# Patient Record
Sex: MALE | Race: WHITE | NOT HISPANIC OR LATINO
[De-identification: names, ages, dates, MRNs, and addresses within clinical notes are randomized per-mention and may not be internally consistent; named-entity substitution may affect disease eponyms.]

---

## 2019-02-14 ENCOUNTER — APPOINTMENT (OUTPATIENT)
Dept: CARDIOLOGY | Facility: CLINIC | Age: 39
End: 2019-02-14
Payer: COMMERCIAL

## 2019-02-14 ENCOUNTER — NON-APPOINTMENT (OUTPATIENT)
Age: 39
End: 2019-02-14

## 2019-02-14 VITALS
DIASTOLIC BLOOD PRESSURE: 85 MMHG | BODY MASS INDEX: 26.55 KG/M2 | WEIGHT: 196 LBS | HEIGHT: 72 IN | RESPIRATION RATE: 16 BRPM | SYSTOLIC BLOOD PRESSURE: 120 MMHG | HEART RATE: 72 BPM

## 2019-02-14 DIAGNOSIS — Z78.9 OTHER SPECIFIED HEALTH STATUS: ICD-10-CM

## 2019-02-14 DIAGNOSIS — Z72.3 LACK OF PHYSICAL EXERCISE: ICD-10-CM

## 2019-02-14 DIAGNOSIS — Z87.891 PERSONAL HISTORY OF NICOTINE DEPENDENCE: ICD-10-CM

## 2019-02-14 PROCEDURE — 93000 ELECTROCARDIOGRAM COMPLETE: CPT

## 2019-02-14 PROCEDURE — 99244 OFF/OP CNSLTJ NEW/EST MOD 40: CPT

## 2019-02-14 NOTE — HISTORY OF PRESENT ILLNESS
[FreeTextEntry1] : Patient comes in to seeing you in the office and been noted to have an abnormal EKG showing possible inferior Q waves. Per the patient these have shown up off and on over the years on his various EKGs. 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 He really feels a little palpitation but this does  not concern him. Patient denies chest pain, shortness of breath, orthopnea, presyncope, syncope.

## 2019-02-14 NOTE — DISCUSSION/SUMMARY
[FreeTextEntry1] : 1. Check echocardiogram given the abnormal EKG.\par 2. If echo is unremarkable no further cardiac treatment or testing at this time. We will discuss the results over the phone.\par 3. If echo is normal no need for further routine cardiac followup but I will be available as needed.

## 2019-02-14 NOTE — ASSESSMENT
[FreeTextEntry1] : EKG: Sinus rhythm with no significant ST or T wave changes.  EKG from your office does show borderline inferior Q waves.\par \par 38-year-old man with a significant past medical history other than dyslipidemia, primarily hypertriglyceridemia who presents today for evaluation of abnormal EKG showing borderline inferior Q waves. This likely represents a normal variant I do not believe that this patient had a prior MI. It would be reasonable to perform echocardiography to ensure this is the case but his echocardiogram is normal there is no need for further cardiac workup or treatment at this time. His triglycerides are elevated but apparently have improved and we discussed the need to continue work on improving and further

## 2019-03-07 ENCOUNTER — APPOINTMENT (OUTPATIENT)
Dept: CARDIOLOGY | Facility: CLINIC | Age: 39
End: 2019-03-07
Payer: COMMERCIAL

## 2019-03-07 PROCEDURE — 93306 TTE W/DOPPLER COMPLETE: CPT

## 2020-01-03 ENCOUNTER — TRANSCRIPTION ENCOUNTER (OUTPATIENT)
Age: 40
End: 2020-01-03

## 2020-10-14 ENCOUNTER — NON-APPOINTMENT (OUTPATIENT)
Age: 40
End: 2020-10-14

## 2020-10-14 ENCOUNTER — APPOINTMENT (OUTPATIENT)
Dept: CARDIOLOGY | Facility: CLINIC | Age: 40
End: 2020-10-14
Payer: COMMERCIAL

## 2020-10-14 VITALS
HEART RATE: 70 BPM | HEIGHT: 72 IN | TEMPERATURE: 96.6 F | OXYGEN SATURATION: 100 % | SYSTOLIC BLOOD PRESSURE: 145 MMHG | WEIGHT: 200 LBS | RESPIRATION RATE: 16 BRPM | DIASTOLIC BLOOD PRESSURE: 88 MMHG | BODY MASS INDEX: 27.09 KG/M2

## 2020-10-14 DIAGNOSIS — K21.9 GASTRO-ESOPHAGEAL REFLUX DISEASE W/OUT ESOPHAGITIS: ICD-10-CM

## 2020-10-14 PROCEDURE — 99214 OFFICE O/P EST MOD 30 MIN: CPT

## 2020-10-14 PROCEDURE — 93000 ELECTROCARDIOGRAM COMPLETE: CPT

## 2020-10-14 NOTE — DISCUSSION/SUMMARY
[FreeTextEntry1] : 1. Check echocardiogram given his possibly enlarged right side on his last echo.\par 2. Monitor BP at home, keep a log and bring to f/u.\par 3. No additional cardiac medications at this time.\par 4. Check blood work including a secondary workup for hypertension.\par 5. Patient strongly encouraged on reducing salt intake.\par 6. Follow up here in six weeks.

## 2020-10-14 NOTE — HISTORY OF PRESENT ILLNESS
[FreeTextEntry1] : Patient presents back today returning to the office because he was recently in urgent care and found to be hypertensive. He was working at his desk last week when he suddenly felt flushed and had a headache and a small nosebleed. He checked his blood pressure and it was in the 150s over 100s. He then went to urgent care and was evaluated with a blood pressure of 140s over 90s. He was given medication for hypertension but had not started. He also was told his EKG was abnormal and to follow up with cardiology. Since that day his symptoms all resolved. He has not been checking his blood pressure. He does report a couple of episodes of palpitations in the last week or so which he describes as after beating of his heart. This has occurred intermittently over the years now and then. No other symptoms. Patient denies chest pain, shortness of breath, orthopnea, presyncope, syncope.

## 2020-10-14 NOTE — ASSESSMENT
[FreeTextEntry1] : EKG: Sinus rhythm with no significant ST or T wave changes.\par \par 39-year-old man with no significant past medical history who presents today because of recently elevated blood pressures and a possibly abnormal EKG. Review of the EKG shows borderline inferior Q waves as had been seen previously and are of no clinical significance. His blood pressure is certainly elevated here today. At this time I would like him to start monitoring it at home before making decisions regarding treatment. Will also initiate a secondary workup for hypertension. Echocardiogram last year showed a questionably enlarged right side and I will repeat his echo at this time as well.

## 2020-11-20 ENCOUNTER — APPOINTMENT (OUTPATIENT)
Dept: CARDIOLOGY | Facility: CLINIC | Age: 40
End: 2020-11-20
Payer: COMMERCIAL

## 2020-11-20 PROCEDURE — 93306 TTE W/DOPPLER COMPLETE: CPT

## 2020-12-01 ENCOUNTER — APPOINTMENT (OUTPATIENT)
Dept: CARDIOLOGY | Facility: CLINIC | Age: 40
End: 2020-12-01
Payer: COMMERCIAL

## 2020-12-01 VITALS
DIASTOLIC BLOOD PRESSURE: 93 MMHG | TEMPERATURE: 98.1 F | HEIGHT: 72 IN | RESPIRATION RATE: 16 BRPM | BODY MASS INDEX: 26.68 KG/M2 | WEIGHT: 197 LBS | OXYGEN SATURATION: 99 % | HEART RATE: 81 BPM | SYSTOLIC BLOOD PRESSURE: 155 MMHG

## 2020-12-01 DIAGNOSIS — Z82.49 FAMILY HISTORY OF ISCHEMIC HEART DISEASE AND OTHER DISEASES OF THE CIRCULATORY SYSTEM: ICD-10-CM

## 2020-12-01 DIAGNOSIS — R03.0 ELEVATED BLOOD-PRESSURE READING, W/OUT DIAGNOSIS OF HYPERTENSION: ICD-10-CM

## 2020-12-01 DIAGNOSIS — R94.31 ABNORMAL ELECTROCARDIOGRAM [ECG] [EKG]: ICD-10-CM

## 2020-12-01 PROCEDURE — 99072 ADDL SUPL MATRL&STAF TM PHE: CPT

## 2020-12-01 PROCEDURE — 99214 OFFICE O/P EST MOD 30 MIN: CPT

## 2020-12-01 NOTE — DISCUSSION/SUMMARY
[FreeTextEntry1] : 1. No additional cardiac testing at this time.\par 2. Start lisinopril HCT 20/12.5 mg daily for hypertension.  Repeat blood work in 2 to 4 weeks.\par 3. Monitor BP at home, keep a log and bring to f/u.\par 4. Patient strongly encouraged on reducing salt intake.\par 5. Patient encouraged to work on a healthy diet high in lean protein, whole grains and vegetables, and lower in white flour and simple sugars.\par 6. Patient is encouraged to exercise at least 30 minutes a day everyday of the week.\par 7. Follow up here in two months.\par

## 2020-12-01 NOTE — ASSESSMENT
[FreeTextEntry1] : [place] November 20, 2020 demonstrated left ventricle normal in size and function with ejection fraction of 55 to 60%.  No significant valvular or structural abnormality noted.  Right side appears normal in size.\par \par 39-year-old man with no significant past medical history who presents for follow-up.  Review of his blood pressure shows continued and sustained elevations.  The patient clearly has hypertension and will require therapy with at least 2, if not 3, medications.  Review of his blood work shows no evidence of a secondary cause of hypertension.  Lipids are somewhat elevated but for now do not require treatment.  We discussed the importance of diet and exercise.  There is no evidence of diabetes.  Patient is otherwise stable.

## 2020-12-01 NOTE — HISTORY OF PRESENT ILLNESS
[FreeTextEntry1] : Patient presents back today feeling well and offering no complaints.  He continues to be active and is able to do so with no physical limitations at all.  He has been monitoring his blood pressure at home and it has been running from the 130s to 170s over 80s to 90s.  He remains on no medication at this time.  Patient denies chest pain, shortness of breath, palpitations, orthopnea, presyncope, syncope.

## 2021-03-04 ENCOUNTER — APPOINTMENT (OUTPATIENT)
Dept: CARDIOLOGY | Facility: CLINIC | Age: 41
End: 2021-03-04
Payer: COMMERCIAL

## 2021-03-04 ENCOUNTER — NON-APPOINTMENT (OUTPATIENT)
Age: 41
End: 2021-03-04

## 2021-03-04 VITALS
BODY MASS INDEX: 27.09 KG/M2 | DIASTOLIC BLOOD PRESSURE: 76 MMHG | RESPIRATION RATE: 12 BRPM | WEIGHT: 200 LBS | TEMPERATURE: 97.9 F | HEART RATE: 79 BPM | HEIGHT: 72 IN | SYSTOLIC BLOOD PRESSURE: 110 MMHG

## 2021-03-04 PROCEDURE — 99072 ADDL SUPL MATRL&STAF TM PHE: CPT

## 2021-03-04 PROCEDURE — 99213 OFFICE O/P EST LOW 20 MIN: CPT

## 2021-03-04 PROCEDURE — 93000 ELECTROCARDIOGRAM COMPLETE: CPT

## 2021-03-04 NOTE — HISTORY OF PRESENT ILLNESS
[FreeTextEntry1] : Patient presents back today having had COVID-19 in early January.  He had a relatively mild case with some fever and congestion and recovered quickly.  He has no residual symptoms at all.  He otherwise has been feeling well although it has been a bit stressful in his family.  Blood pressures have been in the 110s to 120s over 70s and he tolerated the addition of lisinopril HCT without a problem.  He does have some occasional dizziness on standing but this does not seem to bother him very much.  Patient denies chest pain, shortness of breath, palpitations, orthopnea, presyncope, syncope.

## 2021-03-04 NOTE — DISCUSSION/SUMMARY
[FreeTextEntry1] : 1. No additional cardiac testing at this time.\par 2. Continue HCT 20/12.5 mg daily for hypertension.  Repeat blood work.\par 3. Monitor BP at home, keep a log and bring to f/u.\par 4. Patient strongly encouraged on reducing salt intake.\par 5. Patient encouraged to work on a healthy diet high in lean protein, whole grains and vegetables, and lower in white flour and simple sugars.\par 6. Patient is encouraged to exercise at least 30 minutes a day everyday of the week.\par 7. Follow up here in 4 to 5 months

## 2021-03-04 NOTE — ASSESSMENT
[FreeTextEntry1] : Echocardiogram November 20, 2020 demonstrated left ventricle normal in size and function with ejection fraction of 55 to 60%.  No significant valvular or structural abnormality noted.  Right side appears normal in size.\par \par EKG: Sinus rhythm with no significant ST or T wave changes.\par \par 40-year-old man with past medical history of recently diagnosed hypertension and dyslipidemia who presents to me for follow-up.  Patient tolerated the addition of lisinopril HCT with no significant issue.  Blood pressures are now very well controlled.  He has no other symptoms at this time and is feeling well, having recovered from his COVID-19 infection.  I have encouraged him to get into exercising more which certainly should help with his mild dyslipidemia.

## 2021-03-10 LAB
ALBUMIN SERPL ELPH-MCNC: 4.7 G/DL
ALP BLD-CCNC: 87 U/L
ALT SERPL-CCNC: 27 U/L
ANION GAP SERPL CALC-SCNC: 11 MMOL/L
AST SERPL-CCNC: 19 U/L
BILIRUB SERPL-MCNC: 0.4 MG/DL
BUN SERPL-MCNC: 11 MG/DL
CALCIUM SERPL-MCNC: 9.7 MG/DL
CHLORIDE SERPL-SCNC: 99 MMOL/L
CHOLEST SERPL-MCNC: 229 MG/DL
CO2 SERPL-SCNC: 26 MMOL/L
CREAT SERPL-MCNC: 0.91 MG/DL
GLUCOSE SERPL-MCNC: 89 MG/DL
HDLC SERPL-MCNC: 39 MG/DL
LDLC SERPL CALC-MCNC: NORMAL MG/DL
MAGNESIUM SERPL-MCNC: 2.1 MG/DL
NONHDLC SERPL-MCNC: 190 MG/DL
POTASSIUM SERPL-SCNC: 4.6 MMOL/L
PROT SERPL-MCNC: 7.7 G/DL
SODIUM SERPL-SCNC: 137 MMOL/L
TRIGL SERPL-MCNC: 458 MG/DL

## 2021-03-11 ENCOUNTER — NON-APPOINTMENT (OUTPATIENT)
Age: 41
End: 2021-03-11

## 2021-06-11 ENCOUNTER — RX RENEWAL (OUTPATIENT)
Age: 41
End: 2021-06-11

## 2021-08-07 ENCOUNTER — LABORATORY RESULT (OUTPATIENT)
Age: 41
End: 2021-08-07

## 2021-08-09 ENCOUNTER — APPOINTMENT (OUTPATIENT)
Dept: CARDIOLOGY | Facility: CLINIC | Age: 41
End: 2021-08-09
Payer: COMMERCIAL

## 2021-08-09 ENCOUNTER — NON-APPOINTMENT (OUTPATIENT)
Age: 41
End: 2021-08-09

## 2021-08-09 VITALS
DIASTOLIC BLOOD PRESSURE: 97 MMHG | HEIGHT: 72 IN | SYSTOLIC BLOOD PRESSURE: 158 MMHG | BODY MASS INDEX: 26.55 KG/M2 | RESPIRATION RATE: 16 BRPM | OXYGEN SATURATION: 100 % | HEART RATE: 77 BPM | WEIGHT: 196 LBS

## 2021-08-09 DIAGNOSIS — I51.7 CARDIOMEGALY: ICD-10-CM

## 2021-08-09 PROCEDURE — 99213 OFFICE O/P EST LOW 20 MIN: CPT

## 2021-08-09 PROCEDURE — 93000 ELECTROCARDIOGRAM COMPLETE: CPT

## 2021-08-09 RX ORDER — LISINOPRIL AND HYDROCHLOROTHIAZIDE TABLETS 20; 12.5 MG/1; MG/1
20-12.5 TABLET ORAL DAILY
Qty: 90 | Refills: 1 | Status: DISCONTINUED | COMMUNITY
Start: 2020-12-01 | End: 2021-08-09

## 2021-08-09 NOTE — ASSESSMENT
[FreeTextEntry1] : Echocardiogram November 20, 2020 demonstrated left ventricle normal in size and function with ejection fraction of 55 to 60%.  No significant valvular or structural abnormality noted.  Right side appears normal in size.\par \par EKG: Sinus rhythm with no significant ST or T wave changes.\par \par 40-year-old man with past medical history of recently diagnosed hypertension and dyslipidemia who presents to me for follow-up.  Patient recently stopped his lisinopril HCT on his own because he thought it was related to his erectile dysfunction.  That has improved and his blood pressures appear to still be well controlled at home.  His blood pressure is more elevated here today but he admits to having a lot of angst about coming here given the issues with stopping his medication.  For now we will not add any medication but he will continue monitoring it closely at home.  I will repeat his blood work in get a direct LDL and reevaluation of Roberta steroids.  Consider low vase if his triglycerides remain elevated.

## 2021-08-09 NOTE — DISCUSSION/SUMMARY
[FreeTextEntry1] : 1. No additional cardiac testing at this time.  Repeat blood work to evaluate his lipids, including direct LDL prior to follow-up.\par 2. Continue off medication for hypertension for now.\par 3. Monitor BP at home, keep a log and bring to f/u.\par 4. Patient strongly encouraged on reducing salt intake.\par 5. Patient encouraged to work on a healthy diet high in lean protein, whole grains and vegetables, and lower in white flour and simple sugars.\par 6. Patient is encouraged to exercise at least 30 minutes a day everyday of the week.\par 7. Follow up here in 2 months.

## 2021-08-09 NOTE — HISTORY OF PRESENT ILLNESS
[FreeTextEntry1] : Patient presents back today having stopped his lisinopril HCT about 3 weeks ago.  He was having some issues with erectile dysfunction and noticed after he missed it for a few days of the seem to be better.  He then stopped the medication altogether.  He reports that his blood pressures have been consistently in the 110s to 120s over 60s since he stopped it right up until coming here today.  He reports no other physical symptoms at all at this time and is otherwise doing well.  He has made some adjustments to his diet lost a few pounds as well as been doing more exercising.  Patient denies chest pain, shortness of breath, palpitations, orthopnea, presyncope, syncope.

## 2021-10-19 ENCOUNTER — NON-APPOINTMENT (OUTPATIENT)
Age: 41
End: 2021-10-19

## 2021-10-19 ENCOUNTER — APPOINTMENT (OUTPATIENT)
Dept: CARDIOLOGY | Facility: CLINIC | Age: 41
End: 2021-10-19
Payer: COMMERCIAL

## 2021-10-19 VITALS
WEIGHT: 193 LBS | OXYGEN SATURATION: 98 % | BODY MASS INDEX: 26.14 KG/M2 | DIASTOLIC BLOOD PRESSURE: 87 MMHG | HEART RATE: 83 BPM | RESPIRATION RATE: 16 BRPM | HEIGHT: 72 IN | SYSTOLIC BLOOD PRESSURE: 149 MMHG

## 2021-10-19 LAB
CHOLEST SERPL-MCNC: 177 MG/DL
HDLC SERPL-MCNC: 41 MG/DL
LDLC SERPL CALC-MCNC: 108 MG/DL
LDLC SERPL DIRECT ASSAY-MCNC: 113 MG/DL
NONHDLC SERPL-MCNC: 136 MG/DL
TRIGL SERPL-MCNC: 139 MG/DL

## 2021-10-19 PROCEDURE — 99213 OFFICE O/P EST LOW 20 MIN: CPT

## 2021-10-19 PROCEDURE — 93000 ELECTROCARDIOGRAM COMPLETE: CPT

## 2021-10-19 RX ORDER — OMEPRAZOLE AND SODIUM BICARBONATE 20; 1100 MG/1; MG/1
CAPSULE ORAL
Refills: 0 | Status: DISCONTINUED | COMMUNITY
End: 2021-10-19

## 2021-10-19 NOTE — HISTORY OF PRESENT ILLNESS
[FreeTextEntry1] : Patient presents back today feeling very well and offering no complaints.  Blood pressures have been ranging from 120s to 150s over 70s to 90s but mostly in the 130s and 140s systolic.  Diastolics mostly in the 80s and 90s.  He continues to be active but does not do any regular exercise.  No symptoms when he exerts himself.  Patient denies chest pain, shortness of breath, palpitations, orthopnea, presyncope, syncope.

## 2021-10-19 NOTE — DISCUSSION/SUMMARY
[FreeTextEntry1] : 1. No additional cardiac testing at this time.  \par 2. Start nifedipine ER 30 mg daily for hypertension.\par 3. Monitor BP at home, keep a log and bring to f/u.\par 4. Patient strongly encouraged on reducing salt intake.\par 5. Patient encouraged to work on a healthy diet high in lean protein, whole grains and vegetables, and lower in white flour and simple sugars.\par 6. Patient is encouraged to exercise at least 30 minutes a day everyday of the week.\par 7. Follow up here in 3-4 months.

## 2021-10-19 NOTE — ASSESSMENT
[FreeTextEntry1] : Echocardiogram November 20, 2020 demonstrated left ventricle normal in size and function with ejection fraction of 55 to 60%.  No significant valvular or structural abnormality noted.  Right side appears normal in size.\par \par EKG: Sinus rhythm with no significant ST or T wave changes.\par \par 40-year-old man with past medical history of recently diagnosed hypertension and dyslipidemia who presents to me for follow-up.  Patient is to be stable from a cardiac standpoint but blood pressures remain elevated.  At this time I will try him on antihypertensive therapy again.  I will try something new as he had issues tolerating lisinopril HCT.  Review of his lipids showed significant improvement with triglycerides that are now normal, his LDL is lower and his HDL has improved.  I have encouraged him to continue all of his dietary efforts.  His weight is very good.

## 2021-12-20 ENCOUNTER — RX RENEWAL (OUTPATIENT)
Age: 41
End: 2021-12-20

## 2022-01-19 ENCOUNTER — NON-APPOINTMENT (OUTPATIENT)
Age: 42
End: 2022-01-19

## 2022-01-27 ENCOUNTER — NON-APPOINTMENT (OUTPATIENT)
Age: 42
End: 2022-01-27

## 2022-01-27 ENCOUNTER — APPOINTMENT (OUTPATIENT)
Dept: INTERNAL MEDICINE | Facility: CLINIC | Age: 42
End: 2022-01-27
Payer: COMMERCIAL

## 2022-01-27 VITALS
DIASTOLIC BLOOD PRESSURE: 90 MMHG | HEART RATE: 61 BPM | HEIGHT: 72 IN | BODY MASS INDEX: 25.47 KG/M2 | WEIGHT: 188 LBS | TEMPERATURE: 97.3 F | OXYGEN SATURATION: 98 % | RESPIRATION RATE: 14 BRPM | SYSTOLIC BLOOD PRESSURE: 140 MMHG

## 2022-01-27 DIAGNOSIS — Z23 ENCOUNTER FOR IMMUNIZATION: ICD-10-CM

## 2022-01-27 DIAGNOSIS — Z00.00 ENCOUNTER FOR GENERAL ADULT MEDICAL EXAMINATION W/OUT ABNORMAL FINDINGS: ICD-10-CM

## 2022-01-27 DIAGNOSIS — Z83.79 FAMILY HISTORY OF OTHER DISEASES OF THE DIGESTIVE SYSTEM: ICD-10-CM

## 2022-01-27 PROCEDURE — 90715 TDAP VACCINE 7 YRS/> IM: CPT

## 2022-01-27 PROCEDURE — 90472 IMMUNIZATION ADMIN EACH ADD: CPT

## 2022-01-27 PROCEDURE — 99386 PREV VISIT NEW AGE 40-64: CPT | Mod: 25

## 2022-01-27 PROCEDURE — 90686 IIV4 VACC NO PRSV 0.5 ML IM: CPT

## 2022-01-27 PROCEDURE — G0008: CPT

## 2022-01-27 NOTE — HISTORY OF PRESENT ILLNESS
[de-identified] : 41 y.o. M with PMHx of GERD, hypertriglyceridemia, and HTN presents as new pt to establish care. Pt has been working on his diet and has lost weight. Pt is a former smoker 20+ years, 2 packs a day, quit in 2017. Feeling well, no acute complaints. Needs forms for work filled out.

## 2022-01-27 NOTE — PLAN
[FreeTextEntry1] : HCM: \par -check labs \par -covid vaccine pfizer x2 \par -flu shot today \par -tdap today \par \par HTN: elevated today, pt had caffeine, pt checks at home and reports it is usually lower, continue with nifedipine, monitor BP at home and call if elevated \par

## 2022-01-28 LAB
25(OH)D3 SERPL-MCNC: 15 NG/ML
ALBUMIN SERPL ELPH-MCNC: 4.9 G/DL
ALP BLD-CCNC: 100 U/L
ALT SERPL-CCNC: 20 U/L
ANION GAP SERPL CALC-SCNC: 20 MMOL/L
AST SERPL-CCNC: 17 U/L
BASOPHILS # BLD AUTO: 0.03 K/UL
BASOPHILS NFR BLD AUTO: 0.5 %
BILIRUB SERPL-MCNC: 0.2 MG/DL
BUN SERPL-MCNC: 12 MG/DL
CALCIUM SERPL-MCNC: 10.1 MG/DL
CHLORIDE SERPL-SCNC: 99 MMOL/L
CHOLEST SERPL-MCNC: 212 MG/DL
CO2 SERPL-SCNC: 21 MMOL/L
CREAT SERPL-MCNC: 0.86 MG/DL
EOSINOPHIL # BLD AUTO: 0.07 K/UL
EOSINOPHIL NFR BLD AUTO: 1.1 %
ESTIMATED AVERAGE GLUCOSE: 108 MG/DL
GLUCOSE SERPL-MCNC: 103 MG/DL
HBA1C MFR BLD HPLC: 5.4 %
HBV SURFACE AB SER QL: REACTIVE
HCT VFR BLD CALC: 44.5 %
HDLC SERPL-MCNC: 44 MG/DL
HGB BLD-MCNC: 15.2 G/DL
IMM GRANULOCYTES NFR BLD AUTO: 0.5 %
LDLC SERPL CALC-MCNC: 134 MG/DL
LYMPHOCYTES # BLD AUTO: 1.53 K/UL
LYMPHOCYTES NFR BLD AUTO: 24.8 %
MAN DIFF?: NORMAL
MCHC RBC-ENTMCNC: 31 PG
MCHC RBC-ENTMCNC: 34.2 GM/DL
MCV RBC AUTO: 90.6 FL
MEV IGG FLD QL IA: 178 AU/ML
MEV IGG+IGM SER-IMP: POSITIVE
MONOCYTES # BLD AUTO: 0.62 K/UL
MONOCYTES NFR BLD AUTO: 10 %
MUV AB SER-ACNC: POSITIVE
MUV IGG SER QL IA: 198 AU/ML
NEUTROPHILS # BLD AUTO: 3.89 K/UL
NEUTROPHILS NFR BLD AUTO: 63.1 %
NONHDLC SERPL-MCNC: 167 MG/DL
PLATELET # BLD AUTO: 165 K/UL
POTASSIUM SERPL-SCNC: 4.7 MMOL/L
PROT SERPL-MCNC: 7.9 G/DL
RBC # BLD: 4.91 M/UL
RBC # FLD: 12.8 %
RUBV IGG FLD-ACNC: 3.4 INDEX
RUBV IGG SER-IMP: POSITIVE
SODIUM SERPL-SCNC: 141 MMOL/L
TRIGL SERPL-MCNC: 166 MG/DL
TSH SERPL-ACNC: 1.04 UIU/ML
VZV AB TITR SER: POSITIVE
VZV IGG SER IF-ACNC: 1530 INDEX
WBC # FLD AUTO: 6.17 K/UL

## 2022-02-01 LAB
M TB IFN-G BLD-IMP: NEGATIVE
QUANTIFERON TB PLUS MITOGEN MINUS NIL: 10 IU/ML
QUANTIFERON TB PLUS NIL: 0 IU/ML
QUANTIFERON TB PLUS TB1 MINUS NIL: 0 IU/ML
QUANTIFERON TB PLUS TB2 MINUS NIL: 0 IU/ML

## 2022-02-03 ENCOUNTER — NON-APPOINTMENT (OUTPATIENT)
Age: 42
End: 2022-02-03

## 2022-02-03 ENCOUNTER — APPOINTMENT (OUTPATIENT)
Dept: CARDIOLOGY | Facility: CLINIC | Age: 42
End: 2022-02-03
Payer: COMMERCIAL

## 2022-02-03 VITALS
RESPIRATION RATE: 14 BRPM | BODY MASS INDEX: 25.21 KG/M2 | OXYGEN SATURATION: 98 % | WEIGHT: 186.13 LBS | HEIGHT: 72 IN | DIASTOLIC BLOOD PRESSURE: 82 MMHG | HEART RATE: 74 BPM | SYSTOLIC BLOOD PRESSURE: 114 MMHG

## 2022-02-03 PROCEDURE — 93000 ELECTROCARDIOGRAM COMPLETE: CPT

## 2022-02-03 PROCEDURE — 99213 OFFICE O/P EST LOW 20 MIN: CPT

## 2022-02-03 NOTE — ASSESSMENT
[FreeTextEntry1] : Echocardiogram November 20, 2020 demonstrated left ventricle normal in size and function with ejection fraction of 55 to 60%.  No significant valvular or structural abnormality noted.  Right side appears normal in size.\par \par EKG: Sinus rhythm with no significant ST or T wave changes.\par \par 40-year-old man with past medical history of hypertension and dyslipidemia who presents to me for follow-up.  Patient is to be stable from a cardiac standpoint.  He tolerated the addition of nifedipine without a problem.  His blood pressures appear to be somewhat better although not still perfectly controlled.  Evaluation of his machine today shows that it may be reading a little bit high.  I have asked him to continue taking the medication and change the batteries in his machine.  He will continue checking it at home.  I have also encouraged him to get in doing some more regular exercise.  He may ultimately need higher dosing of his medication but I will not make any changes for now.  Lipid control appears adequate although his LDL did go up a little bit since his prior check.

## 2022-02-03 NOTE — HISTORY OF PRESENT ILLNESS
[FreeTextEntry1] : Patient has back to the office today feeling very well and offering no complaints.  He continues to be very active at work and in his daily life without any physical limitations.  He admits that he has not do any higher level exercise but is considering getting a universal gym at home.  Blood pressures have been better to some degree ranging in the 120s to 140s over 70s to 80s.  Patient denies chest pain, shortness of breath, palpitations, orthopnea, presyncope, syncope.

## 2022-02-03 NOTE — DISCUSSION/SUMMARY
[FreeTextEntry1] : 1. No additional cardiac testing at this time.  \par 2. Continue nifedipine ER 30 mg daily for hypertension.\par 3. Monitor BP at home, keep a log and bring to f/u.\par 4. Patient strongly encouraged on reducing salt intake.\par 5. Patient encouraged to work on a healthy diet high in lean protein, whole grains and vegetables, and lower in white flour and simple sugars.\par 6. Patient is encouraged to exercise at least 30 minutes a day everyday of the week.\par 7. Follow up here in 3-4 months.

## 2022-05-03 ENCOUNTER — RX RENEWAL (OUTPATIENT)
Age: 42
End: 2022-05-03

## 2022-06-02 ENCOUNTER — APPOINTMENT (OUTPATIENT)
Dept: CARDIOLOGY | Facility: CLINIC | Age: 42
End: 2022-06-02
Payer: COMMERCIAL

## 2022-06-02 ENCOUNTER — NON-APPOINTMENT (OUTPATIENT)
Age: 42
End: 2022-06-02

## 2022-06-02 VITALS
SYSTOLIC BLOOD PRESSURE: 152 MMHG | DIASTOLIC BLOOD PRESSURE: 99 MMHG | OXYGEN SATURATION: 100 % | HEART RATE: 82 BPM | HEIGHT: 72 IN | RESPIRATION RATE: 16 BRPM | WEIGHT: 189 LBS | BODY MASS INDEX: 25.6 KG/M2

## 2022-06-02 VITALS — SYSTOLIC BLOOD PRESSURE: 130 MMHG | DIASTOLIC BLOOD PRESSURE: 80 MMHG

## 2022-06-02 DIAGNOSIS — E55.9 VITAMIN D DEFICIENCY, UNSPECIFIED: ICD-10-CM

## 2022-06-02 PROCEDURE — 93000 ELECTROCARDIOGRAM COMPLETE: CPT

## 2022-06-02 PROCEDURE — 99213 OFFICE O/P EST LOW 20 MIN: CPT

## 2022-06-02 NOTE — HISTORY OF PRESENT ILLNESS
[FreeTextEntry1] : Patient has back to the office today feeling well and offering no complaints.  He continues to tolerate his medication without a problem.  He reports blood pressures in the 130s over 80s at home.  He did change the batteries in his machine but never brought it in to be calibrated.  He did not bring a list with him here today.  He continues to be active and plays softball for exercise and is able to all of that with no limitations.  Patient denies chest pain, shortness of breath, palpitations, orthopnea, presyncope, syncope.

## 2022-06-02 NOTE — ASSESSMENT
[FreeTextEntry1] : Echocardiogram November 20, 2020 demonstrated left ventricle normal in size and function with ejection fraction of 55 to 60%.  No significant valvular or structural abnormality noted.  Right side appears normal in size.\par \par EKG: Sinus rhythm with no significant ST or T wave changes.\par \par 41-year-old man with past medical history of hypertension and dyslipidemia who presents to me for follow-up.  Patient is to be stable from a cardiac standpoint.  Blood pressure still somewhat borderline but I will continue to hold off on making any additional changes for now.  I have encouraged him to work on reducing salt in his diet.  Recent blood work did show an increase in his LDL and his triglycerides a little bit but history glycerides are still much lower than they were a year ago.  We talked also about the need to cut down on carbohydrates despite his very good control and his weight.  Also continued efforts to exercise would be helpful.

## 2022-06-02 NOTE — DISCUSSION/SUMMARY
[FreeTextEntry1] : 1. No additional cardiac testing at this time.  \par 2. Continue nifedipine ER 30 mg daily for hypertension.\par 3. Monitor BP at home, keep a log and bring to f/u.  He will bring his machine in to have it calibrated again at follow-up.\par 4. Patient strongly encouraged on reducing salt intake.\par 5. Patient encouraged to work on a healthy diet high in lean protein, whole grains and vegetables, and lower in white flour and simple sugars.\par 6. Patient is encouraged to exercise at least 30 minutes a day everyday of the week.\par 7. Follow up here in 6 months.

## 2022-06-10 ENCOUNTER — NON-APPOINTMENT (OUTPATIENT)
Age: 42
End: 2022-06-10

## 2022-06-10 LAB
25(OH)D3 SERPL-MCNC: 73 NG/ML
ALBUMIN SERPL ELPH-MCNC: 4.9 G/DL
ALP BLD-CCNC: 108 U/L
ALT SERPL-CCNC: 17 U/L
ANION GAP SERPL CALC-SCNC: 17 MMOL/L
AST SERPL-CCNC: 16 U/L
BILIRUB SERPL-MCNC: 0.4 MG/DL
BUN SERPL-MCNC: 15 MG/DL
CALCIUM SERPL-MCNC: 10.7 MG/DL
CHLORIDE SERPL-SCNC: 99 MMOL/L
CHOLEST SERPL-MCNC: 213 MG/DL
CO2 SERPL-SCNC: 24 MMOL/L
CREAT SERPL-MCNC: 0.83 MG/DL
EGFR: 111 ML/MIN/1.73M2
GLUCOSE SERPL-MCNC: 95 MG/DL
HDLC SERPL-MCNC: 48 MG/DL
LDLC SERPL CALC-MCNC: 120 MG/DL
MAGNESIUM SERPL-MCNC: 2 MG/DL
NONHDLC SERPL-MCNC: 165 MG/DL
POTASSIUM SERPL-SCNC: 5.3 MMOL/L
PROT SERPL-MCNC: 7.6 G/DL
SODIUM SERPL-SCNC: 139 MMOL/L
TRIGL SERPL-MCNC: 224 MG/DL

## 2022-09-14 ENCOUNTER — NON-APPOINTMENT (OUTPATIENT)
Age: 42
End: 2022-09-14

## 2022-09-15 ENCOUNTER — APPOINTMENT (OUTPATIENT)
Dept: INTERNAL MEDICINE | Facility: CLINIC | Age: 42
End: 2022-09-15

## 2022-09-15 VITALS
OXYGEN SATURATION: 98 % | HEIGHT: 72 IN | SYSTOLIC BLOOD PRESSURE: 136 MMHG | BODY MASS INDEX: 26.01 KG/M2 | RESPIRATION RATE: 14 BRPM | WEIGHT: 192 LBS | HEART RATE: 92 BPM | DIASTOLIC BLOOD PRESSURE: 82 MMHG

## 2022-09-15 DIAGNOSIS — Z01.818 ENCOUNTER FOR OTHER PREPROCEDURAL EXAMINATION: ICD-10-CM

## 2022-09-15 PROCEDURE — 99214 OFFICE O/P EST MOD 30 MIN: CPT

## 2022-09-15 NOTE — HISTORY OF PRESENT ILLNESS
[No Pertinent Cardiac History] : no history of aortic stenosis, atrial fibrillation, coronary artery disease, recent myocardial infarction, or implantable device/pacemaker [No Pertinent Pulmonary History] : no history of asthma, COPD, sleep apnea, or smoking [No Adverse Anesthesia Reaction] : no adverse anesthesia reaction in self or family member [(Patient denies any chest pain, claudication, dyspnea on exertion, orthopnea, palpitations or syncope)] : Patient denies any chest pain, claudication, dyspnea on exertion, orthopnea, palpitations or syncope [Good (7-10 METs)] : Good (7-10 METs) [Chronic Anticoagulation] : no chronic anticoagulation [Chronic Kidney Disease] : no chronic kidney disease [Diabetes] : no diabetes [FreeTextEntry1] : right shoulder arthroscopy  [FreeTextEntry2] : 9/22 [FreeTextEntry3] : Dr. Estrada [FreeTextEntry4] : Pt here for MCA. FEeling well, no acute complaints.

## 2022-09-15 NOTE — PLAN
[FreeTextEntry1] : EKG and PST labs done at [facility] reviewed \par EKG unchanged from prior \par labs wnl \par pt medically optimized for planned procedure\par hold all vitamins including fish oil one week prior

## 2022-11-28 ENCOUNTER — NON-APPOINTMENT (OUTPATIENT)
Age: 42
End: 2022-11-28

## 2022-11-28 ENCOUNTER — APPOINTMENT (OUTPATIENT)
Dept: CARDIOLOGY | Facility: CLINIC | Age: 42
End: 2022-11-28

## 2022-11-28 VITALS
BODY MASS INDEX: 25.73 KG/M2 | HEIGHT: 72 IN | RESPIRATION RATE: 16 BRPM | HEART RATE: 98 BPM | DIASTOLIC BLOOD PRESSURE: 96 MMHG | SYSTOLIC BLOOD PRESSURE: 145 MMHG | WEIGHT: 190 LBS | OXYGEN SATURATION: 99 %

## 2022-11-28 PROCEDURE — 99213 OFFICE O/P EST LOW 20 MIN: CPT | Mod: 25

## 2022-11-28 PROCEDURE — 93000 ELECTROCARDIOGRAM COMPLETE: CPT

## 2022-11-28 NOTE — DISCUSSION/SUMMARY
[FreeTextEntry1] : 1. No additional cardiac testing at this time.  \par 2. Increase nifedipine ER to 60 mg daily for hypertension.\par 3. Monitor BP at home, keep a log and bring to f/u.  He will bring his machine in to have it calibrated again at follow-up.\par 4. Patient strongly encouraged on reducing salt intake.\par 5. Patient encouraged to work on a healthy diet high in lean protein, whole grains and vegetables, and lower in white flour and simple sugars.\par 6. Patient is encouraged to exercise at least 30 minutes a day everyday of the week.\par 7. Follow up here in 6 months. [EKG obtained to assist in diagnosis and management of assessed problem(s)] : EKG obtained to assist in diagnosis and management of assessed problem(s)

## 2022-11-28 NOTE — ASSESSMENT
[FreeTextEntry1] : Echocardiogram November 20, 2020 demonstrated left ventricle normal in size and function with ejection fraction of 55 to 60%.  No significant valvular or structural abnormality noted.  Right side appears normal in size.\par \par EKG: Sinus rhythm with no significant ST or T wave changes.\par \par 41-year-old man with past medical history of hypertension and dyslipidemia who presents to me for follow-up.  Patient is to be stable from a cardiac standpoint.  Blood pressures continue to be mildly elevated.  At this point have recommended increasing nifedipine to 60 mg daily.  I have encouraged him to get back into his exercising which should help as well.  EKG today is unremarkable.  He is otherwise very stable from a cardiac standpoint.

## 2022-11-28 NOTE — HISTORY OF PRESENT ILLNESS
[FreeTextEntry1] : Patient presents back to the office today feeling well.  Blood pressures have continued to be in the mid to high 130s over 80s.  He continues to try to be active but had shoulder surgery back in September.  He is starting to get back into the gym now.  He reports no symptoms when he exercises.  Patient denies chest pain, shortness of breath, palpitations, orthopnea, presyncope, syncope.

## 2023-05-08 ENCOUNTER — APPOINTMENT (OUTPATIENT)
Dept: CARDIOLOGY | Facility: CLINIC | Age: 43
End: 2023-05-08

## 2023-05-11 ENCOUNTER — NON-APPOINTMENT (OUTPATIENT)
Age: 43
End: 2023-05-11

## 2023-05-11 ENCOUNTER — APPOINTMENT (OUTPATIENT)
Dept: CARDIOLOGY | Facility: CLINIC | Age: 43
End: 2023-05-11
Payer: COMMERCIAL

## 2023-05-11 VITALS
RESPIRATION RATE: 16 BRPM | SYSTOLIC BLOOD PRESSURE: 127 MMHG | OXYGEN SATURATION: 99 % | HEIGHT: 72 IN | DIASTOLIC BLOOD PRESSURE: 86 MMHG | HEART RATE: 75 BPM | WEIGHT: 200 LBS | BODY MASS INDEX: 27.09 KG/M2

## 2023-05-11 PROCEDURE — 93000 ELECTROCARDIOGRAM COMPLETE: CPT

## 2023-05-11 PROCEDURE — 99213 OFFICE O/P EST LOW 20 MIN: CPT | Mod: 25

## 2023-05-11 NOTE — HISTORY OF PRESENT ILLNESS
[FreeTextEntry1] : Patient presents back to the office today feeling very well and offers no complaints.  Unfortunately has gained some weight since last I saw him because he has been less active.  Blood pressures have been in the low 130s over low 80s.  He tolerates his medication without a problem.  Patient denies chest pain, shortness of breath, palpitations, orthopnea, presyncope, syncope.

## 2023-05-11 NOTE — DISCUSSION/SUMMARY
[EKG obtained to assist in diagnosis and management of assessed problem(s)] : EKG obtained to assist in diagnosis and management of assessed problem(s) [FreeTextEntry1] : 1. No additional cardiac testing at this time.  \par 2. Continue Nifedipine ER 60 mg daily for hypertension.\par 3. Monitor BP at home, keep a log and bring to f/u. \par 4. Patient strongly encouraged on reducing salt intake.\par 5. Patient encouraged to work on a healthy diet high in lean protein, whole grains and vegetables, and lower in white flour and simple sugars.\par 6. Patient is encouraged to exercise at least 30 minutes a day everyday of the week.\par 7. He will have blood work done.\par 8. Follow up here in 6 months.

## 2023-05-11 NOTE — ASSESSMENT
[FreeTextEntry1] : Echocardiogram November 20, 2020 demonstrated left ventricle normal in size and function with ejection fraction of 55 to 60%.  No significant valvular or structural abnormality noted.  Right side appears normal in size.\par \par EKG: Sinus rhythm with no significant ST or T wave changes.\par \par 41-year-old man with past medical history of hypertension and dyslipidemia who presents to me for follow-up.  Patient continues to be well from a cardiac standpoint.  Blood pressures are somewhat borderline still but are not make any changes for today.  I encouraged him to get back into exercising and lose a little bit of the weight he gained and hopefully his blood pressure will go back down.  EKG today is unremarkable.  He is due to have blood work done and I will give him a lab slip or he can follow-up with his primary for physical which she is also due to have done.

## 2023-05-21 ENCOUNTER — NON-APPOINTMENT (OUTPATIENT)
Age: 43
End: 2023-05-21

## 2023-11-06 ENCOUNTER — NON-APPOINTMENT (OUTPATIENT)
Age: 43
End: 2023-11-06

## 2023-11-06 ENCOUNTER — APPOINTMENT (OUTPATIENT)
Dept: CARDIOLOGY | Facility: CLINIC | Age: 43
End: 2023-11-06
Payer: COMMERCIAL

## 2023-11-06 VITALS
DIASTOLIC BLOOD PRESSURE: 82 MMHG | RESPIRATION RATE: 16 BRPM | WEIGHT: 205 LBS | HEART RATE: 87 BPM | SYSTOLIC BLOOD PRESSURE: 134 MMHG | BODY MASS INDEX: 27.77 KG/M2 | HEIGHT: 72 IN

## 2023-11-06 LAB
ALBUMIN SERPL ELPH-MCNC: 4.9 G/DL
ALP BLD-CCNC: 92 U/L
ALT SERPL-CCNC: 25 U/L
ANION GAP SERPL CALC-SCNC: 11 MMOL/L
AST SERPL-CCNC: 17 U/L
BILIRUB SERPL-MCNC: 0.4 MG/DL
BUN SERPL-MCNC: 17 MG/DL
CALCIUM SERPL-MCNC: 9.8 MG/DL
CHLORIDE SERPL-SCNC: 99 MMOL/L
CHOLEST SERPL-MCNC: 263 MG/DL
CO2 SERPL-SCNC: 28 MMOL/L
CREAT SERPL-MCNC: 0.99 MG/DL
CRP SERPL HS-MCNC: 1.13 MG/L
EGFR: 96 ML/MIN/1.73M2
ESTIMATED AVERAGE GLUCOSE: 120 MG/DL
GLUCOSE SERPL-MCNC: 100 MG/DL
HBA1C MFR BLD HPLC: 5.8 %
HDLC SERPL-MCNC: 41 MG/DL
LDLC SERPL CALC-MCNC: 155 MG/DL
MAGNESIUM SERPL-MCNC: 2 MG/DL
NONHDLC SERPL-MCNC: 222 MG/DL
POTASSIUM SERPL-SCNC: 4.5 MMOL/L
PROT SERPL-MCNC: 7.7 G/DL
SODIUM SERPL-SCNC: 137 MMOL/L
TRIGL SERPL-MCNC: 355 MG/DL

## 2023-11-06 PROCEDURE — 93000 ELECTROCARDIOGRAM COMPLETE: CPT

## 2023-11-06 PROCEDURE — 99213 OFFICE O/P EST LOW 20 MIN: CPT | Mod: 25

## 2024-01-31 NOTE — PHYSICAL EXAM
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 No audible murmurs or rubs. Regular rate and rhythm.

## 2024-04-16 ENCOUNTER — TRANSCRIPTION ENCOUNTER (OUTPATIENT)
Age: 44
End: 2024-04-16

## 2024-04-16 ENCOUNTER — INPATIENT (INPATIENT)
Facility: HOSPITAL | Age: 44
LOS: 0 days | Discharge: HOME | DRG: 395 | End: 2024-04-17
Attending: SURGERY | Admitting: SURGERY
Payer: COMMERCIAL

## 2024-04-16 ENCOUNTER — RESULT REVIEW (OUTPATIENT)
Age: 44
End: 2024-04-16

## 2024-04-16 VITALS
TEMPERATURE: 99 F | DIASTOLIC BLOOD PRESSURE: 108 MMHG | OXYGEN SATURATION: 100 % | HEART RATE: 64 BPM | SYSTOLIC BLOOD PRESSURE: 176 MMHG | WEIGHT: 207.01 LBS | RESPIRATION RATE: 20 BRPM

## 2024-04-16 DIAGNOSIS — Z98.890 OTHER SPECIFIED POSTPROCEDURAL STATES: Chronic | ICD-10-CM

## 2024-04-16 DIAGNOSIS — K35.80 UNSPECIFIED ACUTE APPENDICITIS: ICD-10-CM

## 2024-04-16 LAB
ABO RH CONFIRMATION: SIGNIFICANT CHANGE UP
ALBUMIN SERPL ELPH-MCNC: 4.2 G/DL — SIGNIFICANT CHANGE UP (ref 3.3–5)
ALP SERPL-CCNC: 83 U/L — SIGNIFICANT CHANGE UP (ref 40–120)
ALT FLD-CCNC: 31 U/L — SIGNIFICANT CHANGE UP (ref 12–78)
ANION GAP SERPL CALC-SCNC: 7 MMOL/L — SIGNIFICANT CHANGE UP (ref 5–17)
APPEARANCE UR: CLEAR — SIGNIFICANT CHANGE UP
APTT BLD: 29.4 SEC — SIGNIFICANT CHANGE UP (ref 24.5–35.6)
AST SERPL-CCNC: 17 U/L — SIGNIFICANT CHANGE UP (ref 15–37)
BASOPHILS # BLD AUTO: 0.04 K/UL — SIGNIFICANT CHANGE UP (ref 0–0.2)
BASOPHILS NFR BLD AUTO: 0.4 % — SIGNIFICANT CHANGE UP (ref 0–2)
BILIRUB SERPL-MCNC: 0.6 MG/DL — SIGNIFICANT CHANGE UP (ref 0.2–1.2)
BILIRUB UR-MCNC: NEGATIVE — SIGNIFICANT CHANGE UP
BLD GP AB SCN SERPL QL: SIGNIFICANT CHANGE UP
BUN SERPL-MCNC: 10 MG/DL — SIGNIFICANT CHANGE UP (ref 7–23)
CALCIUM SERPL-MCNC: 9.4 MG/DL — SIGNIFICANT CHANGE UP (ref 8.5–10.1)
CHLORIDE SERPL-SCNC: 103 MMOL/L — SIGNIFICANT CHANGE UP (ref 96–108)
CO2 SERPL-SCNC: 26 MMOL/L — SIGNIFICANT CHANGE UP (ref 22–31)
COLOR SPEC: YELLOW — SIGNIFICANT CHANGE UP
CREAT SERPL-MCNC: 0.89 MG/DL — SIGNIFICANT CHANGE UP (ref 0.5–1.3)
DIFF PNL FLD: NEGATIVE — SIGNIFICANT CHANGE UP
EGFR: 108 ML/MIN/1.73M2 — SIGNIFICANT CHANGE UP
EOSINOPHIL # BLD AUTO: 0.08 K/UL — SIGNIFICANT CHANGE UP (ref 0–0.5)
EOSINOPHIL NFR BLD AUTO: 0.8 % — SIGNIFICANT CHANGE UP (ref 0–6)
GLUCOSE SERPL-MCNC: 112 MG/DL — HIGH (ref 70–99)
GLUCOSE UR QL: NEGATIVE MG/DL — SIGNIFICANT CHANGE UP
HCT VFR BLD CALC: 42.7 % — SIGNIFICANT CHANGE UP (ref 39–50)
HGB BLD-MCNC: 14.9 G/DL — SIGNIFICANT CHANGE UP (ref 13–17)
IMM GRANULOCYTES NFR BLD AUTO: 0.3 % — SIGNIFICANT CHANGE UP (ref 0–0.9)
INR BLD: 0.95 RATIO — SIGNIFICANT CHANGE UP (ref 0.85–1.18)
KETONES UR-MCNC: NEGATIVE MG/DL — SIGNIFICANT CHANGE UP
LEUKOCYTE ESTERASE UR-ACNC: NEGATIVE — SIGNIFICANT CHANGE UP
LIDOCAIN IGE QN: 25 U/L — SIGNIFICANT CHANGE UP (ref 13–75)
LYMPHOCYTES # BLD AUTO: 1.69 K/UL — SIGNIFICANT CHANGE UP (ref 1–3.3)
LYMPHOCYTES # BLD AUTO: 17.3 % — SIGNIFICANT CHANGE UP (ref 13–44)
MCHC RBC-ENTMCNC: 30.8 PG — SIGNIFICANT CHANGE UP (ref 27–34)
MCHC RBC-ENTMCNC: 34.9 GM/DL — SIGNIFICANT CHANGE UP (ref 32–36)
MCV RBC AUTO: 88.4 FL — SIGNIFICANT CHANGE UP (ref 80–100)
MONOCYTES # BLD AUTO: 0.71 K/UL — SIGNIFICANT CHANGE UP (ref 0–0.9)
MONOCYTES NFR BLD AUTO: 7.3 % — SIGNIFICANT CHANGE UP (ref 2–14)
NEUTROPHILS # BLD AUTO: 7.22 K/UL — SIGNIFICANT CHANGE UP (ref 1.8–7.4)
NEUTROPHILS NFR BLD AUTO: 73.9 % — SIGNIFICANT CHANGE UP (ref 43–77)
NITRITE UR-MCNC: NEGATIVE — SIGNIFICANT CHANGE UP
PH UR: 6 — SIGNIFICANT CHANGE UP (ref 5–8)
PLATELET # BLD AUTO: 274 K/UL — SIGNIFICANT CHANGE UP (ref 150–400)
POTASSIUM SERPL-MCNC: 4.2 MMOL/L — SIGNIFICANT CHANGE UP (ref 3.5–5.3)
POTASSIUM SERPL-SCNC: 4.2 MMOL/L — SIGNIFICANT CHANGE UP (ref 3.5–5.3)
PROT SERPL-MCNC: 8 GM/DL — SIGNIFICANT CHANGE UP (ref 6–8.3)
PROT UR-MCNC: NEGATIVE MG/DL — SIGNIFICANT CHANGE UP
PROTHROM AB SERPL-ACNC: 10.7 SEC — SIGNIFICANT CHANGE UP (ref 9.5–13)
RBC # BLD: 4.83 M/UL — SIGNIFICANT CHANGE UP (ref 4.2–5.8)
RBC # FLD: 12.7 % — SIGNIFICANT CHANGE UP (ref 10.3–14.5)
SODIUM SERPL-SCNC: 136 MMOL/L — SIGNIFICANT CHANGE UP (ref 135–145)
SP GR SPEC: >1.03 — HIGH (ref 1–1.03)
UROBILINOGEN FLD QL: 0.2 MG/DL — SIGNIFICANT CHANGE UP (ref 0.2–1)
WBC # BLD: 9.77 K/UL — SIGNIFICANT CHANGE UP (ref 3.8–10.5)
WBC # FLD AUTO: 9.77 K/UL — SIGNIFICANT CHANGE UP (ref 3.8–10.5)

## 2024-04-16 PROCEDURE — 44970 LAPAROSCOPY APPENDECTOMY: CPT

## 2024-04-16 PROCEDURE — C9399: CPT

## 2024-04-16 PROCEDURE — 99223 1ST HOSP IP/OBS HIGH 75: CPT | Mod: 57

## 2024-04-16 PROCEDURE — C1889: CPT

## 2024-04-16 PROCEDURE — 88304 TISSUE EXAM BY PATHOLOGIST: CPT

## 2024-04-16 PROCEDURE — 74177 CT ABD & PELVIS W/CONTRAST: CPT | Mod: 26,MC

## 2024-04-16 PROCEDURE — 99285 EMERGENCY DEPT VISIT HI MDM: CPT

## 2024-04-16 PROCEDURE — 93010 ELECTROCARDIOGRAM REPORT: CPT

## 2024-04-16 PROCEDURE — 88304 TISSUE EXAM BY PATHOLOGIST: CPT | Mod: 26

## 2024-04-16 RX ORDER — OXYCODONE HYDROCHLORIDE 5 MG/1
5 TABLET ORAL ONCE
Refills: 0 | Status: DISCONTINUED | OUTPATIENT
Start: 2024-04-16 | End: 2024-04-16

## 2024-04-16 RX ORDER — ONDANSETRON 8 MG/1
4 TABLET, FILM COATED ORAL EVERY 6 HOURS
Refills: 0 | Status: DISCONTINUED | OUTPATIENT
Start: 2024-04-16 | End: 2024-04-17

## 2024-04-16 RX ORDER — MEPERIDINE HYDROCHLORIDE 50 MG/ML
12.5 INJECTION INTRAMUSCULAR; INTRAVENOUS; SUBCUTANEOUS ONCE
Refills: 0 | Status: DISCONTINUED | OUTPATIENT
Start: 2024-04-16 | End: 2024-04-16

## 2024-04-16 RX ORDER — ONDANSETRON 8 MG/1
4 TABLET, FILM COATED ORAL ONCE
Refills: 0 | Status: DISCONTINUED | OUTPATIENT
Start: 2024-04-16 | End: 2024-04-17

## 2024-04-16 RX ORDER — FENTANYL CITRATE 50 UG/ML
50 INJECTION INTRAVENOUS
Refills: 0 | Status: DISCONTINUED | OUTPATIENT
Start: 2024-04-16 | End: 2024-04-17

## 2024-04-16 RX ORDER — ACETAMINOPHEN 500 MG
650 TABLET ORAL EVERY 6 HOURS
Refills: 0 | Status: DISCONTINUED | OUTPATIENT
Start: 2024-04-16 | End: 2024-04-17

## 2024-04-16 RX ORDER — SODIUM CHLORIDE 9 MG/ML
1000 INJECTION, SOLUTION INTRAVENOUS
Refills: 0 | Status: DISCONTINUED | OUTPATIENT
Start: 2024-04-16 | End: 2024-04-17

## 2024-04-16 RX ORDER — ACETAMINOPHEN 500 MG
1000 TABLET ORAL ONCE
Refills: 0 | Status: COMPLETED | OUTPATIENT
Start: 2024-04-16 | End: 2024-04-16

## 2024-04-16 RX ORDER — PANTOPRAZOLE SODIUM 20 MG/1
40 TABLET, DELAYED RELEASE ORAL DAILY
Refills: 0 | Status: DISCONTINUED | OUTPATIENT
Start: 2024-04-16 | End: 2024-04-17

## 2024-04-16 RX ORDER — SODIUM CHLORIDE 9 MG/ML
1000 INJECTION INTRAMUSCULAR; INTRAVENOUS; SUBCUTANEOUS
Refills: 0 | Status: DISCONTINUED | OUTPATIENT
Start: 2024-04-16 | End: 2024-04-17

## 2024-04-16 RX ORDER — MORPHINE SULFATE 50 MG/1
4 CAPSULE, EXTENDED RELEASE ORAL EVERY 4 HOURS
Refills: 0 | Status: DISCONTINUED | OUTPATIENT
Start: 2024-04-16 | End: 2024-04-17

## 2024-04-16 RX ORDER — OXYCODONE HYDROCHLORIDE 5 MG/1
5 TABLET ORAL EVERY 6 HOURS
Refills: 0 | Status: DISCONTINUED | OUTPATIENT
Start: 2024-04-16 | End: 2024-04-17

## 2024-04-16 RX ORDER — PIPERACILLIN AND TAZOBACTAM 4; .5 G/20ML; G/20ML
3.38 INJECTION, POWDER, LYOPHILIZED, FOR SOLUTION INTRAVENOUS ONCE
Refills: 0 | Status: COMPLETED | OUTPATIENT
Start: 2024-04-16 | End: 2024-04-16

## 2024-04-16 RX ORDER — ONDANSETRON 8 MG/1
4 TABLET, FILM COATED ORAL ONCE
Refills: 0 | Status: COMPLETED | OUTPATIENT
Start: 2024-04-16 | End: 2024-04-16

## 2024-04-16 RX ADMIN — SODIUM CHLORIDE 125 MILLILITER(S): 9 INJECTION INTRAMUSCULAR; INTRAVENOUS; SUBCUTANEOUS at 23:36

## 2024-04-16 RX ADMIN — MEPERIDINE HYDROCHLORIDE 12.5 MILLIGRAM(S): 50 INJECTION INTRAMUSCULAR; INTRAVENOUS; SUBCUTANEOUS at 23:05

## 2024-04-16 RX ADMIN — Medication 400 MILLIGRAM(S): at 18:11

## 2024-04-16 RX ADMIN — MEPERIDINE HYDROCHLORIDE 12.5 MILLIGRAM(S): 50 INJECTION INTRAMUSCULAR; INTRAVENOUS; SUBCUTANEOUS at 23:50

## 2024-04-16 RX ADMIN — OXYCODONE HYDROCHLORIDE 5 MILLIGRAM(S): 5 TABLET ORAL at 23:34

## 2024-04-16 RX ADMIN — Medication 400 MILLIGRAM(S): at 23:53

## 2024-04-16 RX ADMIN — ONDANSETRON 4 MILLIGRAM(S): 8 TABLET, FILM COATED ORAL at 18:11

## 2024-04-16 RX ADMIN — PIPERACILLIN AND TAZOBACTAM 200 GRAM(S): 4; .5 INJECTION, POWDER, LYOPHILIZED, FOR SOLUTION INTRAVENOUS at 19:18

## 2024-04-16 RX ADMIN — ONDANSETRON 4 MILLIGRAM(S): 8 TABLET, FILM COATED ORAL at 23:34

## 2024-04-16 NOTE — H&P ADULT - ASSESSMENT
A/P:  Acute appendicitis  IV antibiotics  NPO, IV hydration  GI/DVT prophylaxis  Pain control  Incentive spirometry  Pt for surgical intervention of appendectomy  Pt aware of and agrees with all of the above    75 minuted of time spent on pt examination, review of relevant labs and radiologic studies, discussion with relevant services/providers for coordination of pt care and services

## 2024-04-16 NOTE — DISCHARGE NOTE PROVIDER - NSDCFUSCHEDAPPT_GEN_ALL_CORE_FT
Kwame Carlton  [facility] Physician Formerly [facility]  CARDIOLOGY [address]  Scheduled Appointment: 05/13/2024

## 2024-04-16 NOTE — DISCHARGE NOTE PROVIDER - NSDCMRMEDTOKEN_GEN_ALL_CORE_FT
amoxicillin-clavulanate 875 mg-125 mg oral tablet: 875 milligram(s) orally 2 times a day x 7 days  NIFEdipine (Eqv-Procardia XL) 60 mg oral tablet, extended release: 1 tab(s) orally once a day  Tylenol Extra Strength 500 mg oral tablet: 2 tab(s) orally every 6 hours

## 2024-04-16 NOTE — H&P ADULT - NSHPLABSRESULTS_GEN_ALL_CORE
14.9   9.77  )-----------( 274      ( 16 Apr 2024 17:57 )             42.7     CBC Full  -  ( 16 Apr 2024 17:57 )  WBC Count : 9.77 K/uL  RBC Count : 4.83 M/uL  Hemoglobin : 14.9 g/dL  Hematocrit : 42.7 %  Platelet Count - Automated : 274 K/uL  Mean Cell Volume : 88.4 fl  Mean Cell Hemoglobin : 30.8 pg  Mean Cell Hemoglobin Concentration : 34.9 gm/dL  Auto Neutrophil # : 7.22 K/uL  Auto Lymphocyte # : 1.69 K/uL  Auto Monocyte # : 0.71 K/uL  Auto Eosinophil # : 0.08 K/uL  Auto Basophil # : 0.04 K/uL  Auto Neutrophil % : 73.9 %  Auto Lymphocyte % : 17.3 %  Auto Monocyte % : 7.3 %  Auto Eosinophil % : 0.8 %  Auto Basophil % : 0.4 %    04-16    136  |  103  |  10  ----------------------------<  112<H>  4.2   |  26  |  0.89    Ca    9.4      16 Apr 2024 17:57    TPro  8.0  /  Alb  4.2  /  TBili  0.6  /  DBili  x   /  AST  17  /  ALT  31  /  AlkPhos  83  04-16    LIVER FUNCTIONS - ( 16 Apr 2024 17:57 )  Alb: 4.2 g/dL / Pro: 8.0 gm/dL / ALK PHOS: 83 U/L / ALT: 31 U/L / AST: 17 U/L / GGT: x           PT/INR - ( 16 Apr 2024 19:03 )   PT: 10.7 sec;   INR: 0.95 ratio         PTT - ( 16 Apr 2024 19:03 )  PTT:29.4 sec    Radiology:  < from: CT Abdomen and Pelvis w/ IV Cont (04.16.24 @ 18:07) >    ACC: [id number] EXAM:  CT ABDOMEN AND PELVIS IC   ORDERED BY: VICTORIA BLACK     PROCEDURE DATE:  04/16/2024          INTERPRETATION:  CLINICAL INFORMATION: Right lower quadrant pain.   Evaluate for appendicitis.    COMPARISON: None.    < end of copied text >    < from: CT Abdomen and Pelvis w/ IV Cont (04.16.24 @ 18:07) >    IMPRESSION:  Suspect tip appendicitis that needs to be correlated clinically.    Additional findings as above.    Findings were discussed with Dr. VICTORIA BLACK [phone] 4/16/2024 6:26   PM by Dr. Ivory with read back confirmation.    --- End of Report ---    < end of copied text >

## 2024-04-16 NOTE — ED STATDOCS - CLINICAL SUMMARY MEDICAL DECISION MAKING FREE TEXT BOX
In summary this is a 43-year-old male who presents with chief complaint of right lower quadrant pain.  Labs unremarkable.  CT scan was performed and demonstrates findings of a appendicitis.  Patient was given Zosyn, blood cultures drawn.  Discussed with surgery attending, admit to surgery for definitive management.

## 2024-04-16 NOTE — ED ADULT TRIAGE NOTE - CHIEF COMPLAINT QUOTE
abd pain since this morning w/nausea. no GI HX. pt states he's here to r/o appendicitis. denies vomiting/diarrhea or fevers.

## 2024-04-16 NOTE — BRIEF OPERATIVE NOTE - OPERATION/FINDINGS
very distended, erythematous, edematous appendix, removed in its entirety to cecal base. Hemostasis assured

## 2024-04-16 NOTE — DISCHARGE NOTE PROVIDER - CARE PROVIDER_API CALL
[facility]  Surgery  [address] [place] [address]  Phone: [phone]  Fax: [phone]  Follow Up Time: 2 weeks

## 2024-04-16 NOTE — H&P ADULT - NSHPPHYSICALEXAM_GEN_ALL_CORE
Pt is AAOx3  Pt in no acute distress  Neuro: CNII-XII grossly intact   Psych: normal affect  HEENT: Normocephalic, atraumatic, TEO, EOM wnl  Neck: No crepitus, no ecchymosis, no hematoma, to exam, no JVD, no tracheal deviation  Cardiovascular: S1S2 Present  Respiratory: Respiratory Effort normal; no wheezes, rales or rhonchi to exam, CTAB  ABD: bowel sounds (+), soft, + right lower quadrant tenderness to exam, non distended, no rebound, no guarding, no rigidity, no skin changes to exam. No pelvic instability to exam, no skin changes, negative moreno's sign to exam  Musculoskeletal: All digits are warm and well perfused. Pt demonstrates grossly intact sensoromotor function. Pt has good capillary refill to digits, no calf edema or tenderness to exam.  Skin: no jaundice or icteric sclera to exam b/l, no skin changes to exam  ICU Vital Signs Last 24 Hrs  T(C): 36.9 (16 Apr 2024 20:52), Max: 37.4 (16 Apr 2024 16:18)  T(F): 98.4 (16 Apr 2024 20:52), Max: 99.4 (16 Apr 2024 16:18)  HR: 65 (16 Apr 2024 20:52) (64 - 65)  BP: 161/94 (16 Apr 2024 20:52) (161/94 - 176/108)  BP(mean): 110 (16 Apr 2024 20:52) (110 - 126)  ABP: --  ABP(mean): --  RR: 18 (16 Apr 2024 20:52) (18 - 20)  SpO2: 99% (16 Apr 2024 20:52) (99% - 100%)    O2 Parameters below as of 16 Apr 2024 20:52  Patient On (Oxygen Delivery Method): room air

## 2024-04-16 NOTE — DISCHARGE NOTE PROVIDER - NSDCFUADDINST_GEN_ALL_CORE_FT
Please take medications as prescribed. You may shower tomorrow with regular soap and water. Please keep the area dry and clean after shower.  Please do not lift more than 10 pounds for 6 weeks.  If you have any issues call the number above or come to the ER.

## 2024-04-16 NOTE — DISCHARGE NOTE PROVIDER - HOSPITAL COURSE
Patient presented with acute appendicitis. Patient underwent a laparoscopic appendectomy. Hospital course unremarkable. Clear for discharge.   Patient presented with acute appendicitis. Patient underwent a laparoscopic appendectomy. Hospital course unremarkable. Cleared for discharge.

## 2024-04-16 NOTE — ED STATDOCS - PROGRESS NOTE DETAILS
Rene Eli  Spoke with Dr Becerra general surgery attending. Will come and evaluate pt for appendicitis, recommends starting on Zosyn. 44 y/o M with PMHx of HTN presents to the ED c/o abd pain since this morning with associated nausea. Denies vomiting, diarrhea, dysuria. No GI hx. Pt reports tmax 99.6, has felt flush all day. Pt has not seen any other doctors before coming to the ED.  Pt. last ate 1 pm and no food since last night.  Pt. aware of findings and surgical consult and admission.  He is deferring additional pain management.  Luly Fields PA-C

## 2024-04-16 NOTE — ED ADULT NURSE NOTE - OBJECTIVE STATEMENT
pt presents to ED with nausea and abdominal pain mostly on right side that started today. States pain is mild and unrelieved with medication. Pt feels some bloating and excessive burping, denies difficulty voiding or passing stool. No acute distress noted. Denies any blood in stool, fevers, or vomiting.

## 2024-04-16 NOTE — H&P ADULT - HISTORY OF PRESENT ILLNESS
Pt seen and examined at bedside with chaperone. Pt is AAOx3, pt in no acute distress. Pt states c/o 1 day h/o right lower quadrant abd pain, with associated nausea. Pt  denied c/o fever, chills, chest pain, SOB, V/D, extremity pain or dysfunction, hemoptysis, hematemesis, hematuria, hematochexia, headache, diplopia, vertigo, dizzyness.

## 2024-04-16 NOTE — ED STATDOCS - OBJECTIVE STATEMENT
42 y/o M with PMHx of HTN presents to the ED c/o abd pain since this morning with associated nausea. Denies vomiting, diarrhea, dysuria. No GI hx. Pt reports tmax 99.6, has felt flush all day. Pt has not seen any other doctors before coming to the ED.

## 2024-04-17 ENCOUNTER — TRANSCRIPTION ENCOUNTER (OUTPATIENT)
Age: 44
End: 2024-04-17

## 2024-04-17 VITALS
TEMPERATURE: 99 F | RESPIRATION RATE: 17 BRPM | OXYGEN SATURATION: 97 % | SYSTOLIC BLOOD PRESSURE: 130 MMHG | DIASTOLIC BLOOD PRESSURE: 74 MMHG | HEART RATE: 93 BPM

## 2024-04-17 RX ORDER — ACETAMINOPHEN 500 MG
2 TABLET ORAL
Qty: 80 | Refills: 0
Start: 2024-04-17 | End: 2024-04-26

## 2024-04-17 RX ORDER — METRONIDAZOLE 500 MG
1 TABLET ORAL
Qty: 21 | Refills: 0
Start: 2024-04-17 | End: 2024-04-23

## 2024-04-17 RX ORDER — OXYCODONE HYDROCHLORIDE 5 MG/1
1 TABLET ORAL
Qty: 12 | Refills: 0
Start: 2024-04-17 | End: 2024-04-19

## 2024-04-17 RX ORDER — NIFEDIPINE 30 MG
1 TABLET, EXTENDED RELEASE 24 HR ORAL
Refills: 0 | DISCHARGE

## 2024-04-17 RX ADMIN — PANTOPRAZOLE SODIUM 40 MILLIGRAM(S): 20 TABLET, DELAYED RELEASE ORAL at 09:36

## 2024-04-17 RX ADMIN — Medication 1000 MILLIGRAM(S): at 00:10

## 2024-04-17 NOTE — SBIRT NOTE ADULT - NSSBIRTUNABLESCROTHER_GEN_A_CORE
Sw received SBIRT consult. Pt reported consuming 3-4 drinks monthly or less. Score: 3. Pt within guidelines, full screen not indicated. Sw to close consult.

## 2024-04-17 NOTE — DISCHARGE NOTE NURSING/CASE MANAGEMENT/SOCIAL WORK - PATIENT PORTAL LINK FT
You can access the FollowMyHealth Patient Portal offered by [facility] by registering at the following website: [email or web address]. By joining [employer]’s FollowMyHealth portal, you will also be able to view your health information using other applications (apps) compatible with our system.

## 2024-04-17 NOTE — PATIENT PROFILE ADULT - FALL HARM RISK - HARM RISK INTERVENTIONS

## 2024-04-17 NOTE — PROGRESS NOTE ADULT - ASSESSMENT
43M presents day 1 after lap appendectomy. AFVSS.    Plan:  Pain control PRN  Routine vitals  Incentive spirometry  Reg diet  DC fluids once tolerating  Plan for DC later today  I/Os q4 hrs  Daily labs; replete electrolytes PRN  DVT ppx  OOBC, ambulation    Discussed with attending.

## 2024-04-17 NOTE — PROGRESS NOTE ADULT - SUBJECTIVE AND OBJECTIVE BOX
Pt. seen and examined at bedside this morning. Patient denies fever, chest pain, SOB, nausea, vomiting. No acute events overnight.    ROS negative except as above.    Physical Exam:  General: AOx3, Well developed, NAD  HEENT: NC/AT, normal pinnae and tragi  Chest: Normal respiratory effort, equal chest rise  Heart: RRR  Abdomen: Steri-strips clean and dry, nondistended, soft, tender around incisions  Neuro/Psych: No localized deficits. Normal speech, normal tone, normal affect  Skin: Normal, warm, no rashes, no lesions noted  Extremities: Warm, well perfused, no edema    Vitals:  T(C): 37.3 (04-17 @ 00:15), Max: 37.8 (04-16 @ 23:46)  HR: 108 (04-17 @ 00:15) (64 - 140)  BP: 139/57 (04-17 @ 00:15) (110/66 - 176/108)  RR: 18 (04-17 @ 00:15) (18 - 24)  SpO2: 96% (04-17 @ 00:15) (94% - 100%)    04-16 @ 07:01  -  04-17 @ 03:39  --------------------------------------------------------  IN:    Other (mL): 600 mL    sodium chloride 0.9%: 125 mL  Total IN: 725 mL    OUT:    Other (mL): 60 mL    Voided (mL): 0 mL  Total OUT: 60 mL    Total NET: 665 mL          04-16 @ 17:57                    14.9  CBC: 9.77>)-------(<274                     42.7                 136 | 103 | 10    CMP:  ----------------------< 112               4.2 | 26 | 0.89                      Ca:9.4  Phos:-  Mg:-               0.6|      |17        LFTs:  ------|83|-----             -|      |-

## 2024-04-17 NOTE — DISCHARGE NOTE NURSING/CASE MANAGEMENT/SOCIAL WORK - NSPROEXTENSIONSOFSELF_GEN_A_NUR
Consults  Acute Pain Service    Cheyanne Rubio is a 49 y.o. year old female patient who presents for breast reconstruction revision w/ free flap bilateral dermatolipectomy abdomen excision lesion skin torso repair torso dermatolipectomy upper extremity bilateral with Dr. Gorman. Acute Pain consulted for block for postoperative pain control.     Anticipated Postop Pain Issues -   Palliative: typically relieved with IV analgesics and regional local anesthetics  Provocative: typically with movement  Quality: typically burning and aching  Radiation: typically none  Severity: typically severe 8-10/10  Timing: typically constant    Past Medical History:   Diagnosis Date    Abnormal uterine and vaginal bleeding, unspecified 06/08/2018    Abnormal uterine bleeding (AUB)    Acute bronchitis, unspecified 06/08/2018    Acute bronchitis    Acute pharyngitis, unspecified 06/08/2018    Pharyngitis    Anemia, unspecified 06/08/2018    Anemia    Benign neoplasm of unspecified ovary     Dermoid cyst of ovary    Dysmenorrhea, unspecified 06/08/2018    Dysmenorrhea    Dysuria 06/08/2018    Dysuria    Encounter for screening mammogram for malignant neoplasm of breast 06/08/2018    Visit for screening mammogram    Histoplasmosis, 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     Other ovarian dysfunction 10/24/2014    Female infertility due to diminished ovarian reserve    Pain in unspecified joint 06/08/2018    Arthralgia of multiple sites    Personal history of other diseases of the female genital tract     Personal history of endometriosis    Personal history of other diseases of the female genital tract     Personal history of female infertility    Polyp of corpus uteri     Uterine polyp    Stricture and stenosis of cervix uteri     Cervical stenosis (uterine cervix)    Thyroid nodule     Vitamin D deficiency, unspecified 06/08/2018    Vitamin D deficiency        Past Surgical History:    Procedure Laterality Date    CT ABDOMEN PELVIS ANGIOGRAM W AND/OR WO IV CONTRAST  05/22/2023    CT ABDOMEN PELVIS ANGIOGRAM W AND/OR WO IV CONTRAST 5/22/2023 [facility] CT    DILATION AND CURETTAGE OF UTERUS  10/20/2014    Dilation And Curettage    EXPLORATORY LAPAROTOMY      HYSTEROSCOPY      LAPAROSCOPY DIAGNOSTIC / BIOPSY / ASPIRATION / LYSIS  10/20/2014    Exploratory Laparoscopy    OTHER SURGICAL HISTORY  10/20/2014    Hysteroscopy        Family History   Problem Relation Name Age of Onset    Hypertension Mother      Pancreatic cancer Mother      Bone cancer Father      Pancreatic cancer Father      Prostate cancer Father      Rheum arthritis Maternal Grandmother      Breast cancer Other aunt         Social History     Socioeconomic History    Marital status:      Spouse name: Not on file    Number of children: Not on file    Years of education: Not on file    Highest education level: Not on file   Occupational History    Not on file   Tobacco Use    Smoking status: Never    Smokeless tobacco: Never   Vaping Use    Vaping Use: Never used   Substance and Sexual Activity    Alcohol use: Yes     Alcohol/week: 2.0 standard drinks of alcohol     Types: 2 Glasses of wine per week     Comment: occasionally    Drug use: Never    Sexual activity: Defer   Other Topics Concern    Not on file   Social History Narrative    Not on file     Social Determinants of Health     Financial Resource Strain: Low Risk  (11/11/2023)    Overall Financial Resource Strain (CARDIA)     Difficulty of Paying Living Expenses: Not hard at all   Food Insecurity: Not on file   Transportation Needs: No Transportation Needs (12/13/2023)    OASIS : Transportation     Lack of Transportation (Medical): No     Lack of Transportation (Non-Medical): No     Patient Unable or Declines to Respond: No   Physical Activity: Not on file   Stress: Not on file   Social Connections: Feeling Socially Integrated (12/13/2023)    OASIS : Social Isolation      Frequency of experiencing loneliness or isolation: Never   Intimate Partner Violence: Not on file   Housing Stability: Low Risk  (11/11/2023)    Housing Stability Vital Sign     Unable to Pay for Housing in the Last Year: No     Number of Places Lived in the Last Year: 1     Unstable Housing in the Last Year: No        Allergies   Allergen Reactions    Oxycodone Confusion    Codeine-Guaifenesin Other    Nitro Transdermal Other     Pt stated she has a lot of nausea when cream applied    Codeine Nausea/vomiting, Rash and Other         Review of Systems  Gen: No fatigue, anorexia, insomnia, fever.   Eyes: No vision loss, double vision, drainage, eye pain.   ENT: No pharyngitis, dry mouth, no hearing changes or ear discharge  Cardiac: No chest pain, palpitations, syncope, near syncope.   Pulmonary: No shortness of breath, cough, hemoptysis.   Heme/lymph: No swollen glands, fever, bleeding.   GI: No abdominal pain, change in bowel habits, melena, hematemesis, hematochezia, nausea, vomiting, diarrhea.   : No discharge, dysuria, frequency, urgency, hematuria.  Endo: No polyuria or weight loss.   Musculoskeletal: Negative for any pain or loss of ROM/weakness  Skin: No rashes or lesions  Neuro: Normal speech, no numbness or weakness. No gait difficulties  Review of systems is otherwise negative unless stated above or in history of present illness.    Physical Exam:  Constitutional:  no distress, alert and cooperative  Eyes: clear sclera  Head/Neck: No apparent injury, trachea midline  Respiratory/Thorax: Patent airways, thorax symmetric, breathing comfortably  Cardiovascular: no pitting edema  Gastrointestinal: Nondistended  Musculoskeletal: ROM intact  Extremities: no clubbing  Neurological: alert, perez x4  Psychological: Appropriate affect    Results for orders placed or performed during the hospital encounter of 03/27/24 (from the past 24 hour(s))   POCT pregnancy, urine manually resulted   Result Value Ref Range     Preg Test, Ur Negative Negative      Cheyanne Rubio is a 49 y.o. year old female patient who presents for breast reconstruction revision w/ free flap bilateral dermatolipectomy abdomen excision lesion skin torso repair torso dermatolipectomy upper extremity bilateral with Dr. Gorman. Acute Pain consulted for block for postoperative pain control.     Plan:    - Bilateral paravertebral T4 blocks with catheters performed preoperatively on 3/27/24  - Ambit pump with Ropivacaine 0.2%/NaCl 0.9% 500mL, Rate 7 cc/hr bilaterally  - Ambit medication will not interfere with pain medication prescribed by the primary team.   - Please be aware of local anesthetic toxic dose and absorption variability before considering lidocaine patches  - Acute pain service will follow while catheters in place  - Rest of pain management per primary team    Acute Pain Resident  pg [phone] ph [phone]    clothes, phone, phone /eyeglasses

## 2024-04-18 LAB
CULTURE RESULTS: SIGNIFICANT CHANGE UP
SPECIMEN SOURCE: SIGNIFICANT CHANGE UP
SURGICAL PATHOLOGY STUDY: SIGNIFICANT CHANGE UP

## 2024-04-20 DIAGNOSIS — I10 ESSENTIAL (PRIMARY) HYPERTENSION: ICD-10-CM

## 2024-04-20 DIAGNOSIS — Z98.890 OTHER SPECIFIED POSTPROCEDURAL STATES: ICD-10-CM

## 2024-04-20 DIAGNOSIS — K35.80 UNSPECIFIED ACUTE APPENDICITIS: ICD-10-CM

## 2024-04-22 LAB
CULTURE RESULTS: SIGNIFICANT CHANGE UP
CULTURE RESULTS: SIGNIFICANT CHANGE UP
SPECIMEN SOURCE: SIGNIFICANT CHANGE UP
SPECIMEN SOURCE: SIGNIFICANT CHANGE UP

## 2024-04-23 PROBLEM — I10 ESSENTIAL (PRIMARY) HYPERTENSION: Chronic | Status: ACTIVE | Noted: 2024-04-16

## 2024-04-30 ENCOUNTER — NON-APPOINTMENT (OUTPATIENT)
Age: 44
End: 2024-04-30

## 2024-05-01 ENCOUNTER — APPOINTMENT (OUTPATIENT)
Dept: SURGERY | Facility: CLINIC | Age: 44
End: 2024-05-01
Payer: COMMERCIAL

## 2024-05-01 VITALS
WEIGHT: 205 LBS | OXYGEN SATURATION: 99 % | BODY MASS INDEX: 27.77 KG/M2 | SYSTOLIC BLOOD PRESSURE: 150 MMHG | HEART RATE: 90 BPM | HEIGHT: 72 IN | DIASTOLIC BLOOD PRESSURE: 86 MMHG

## 2024-05-01 DIAGNOSIS — Z87.898 PERSONAL HISTORY OF OTHER SPECIFIED CONDITIONS: ICD-10-CM

## 2024-05-01 PROCEDURE — 99024 POSTOP FOLLOW-UP VISIT: CPT

## 2024-05-01 NOTE — PHYSICAL EXAM
[JVD] : no jugular venous distention  [Normal Breath Sounds] : Normal breath sounds [Abdomen Tenderness] : ~T ~M No abdominal tenderness [de-identified] : NAD [de-identified] : wounds healing well.

## 2024-05-13 ENCOUNTER — APPOINTMENT (OUTPATIENT)
Dept: CARDIOLOGY | Facility: CLINIC | Age: 44
End: 2024-05-13
Payer: COMMERCIAL

## 2024-05-13 ENCOUNTER — NON-APPOINTMENT (OUTPATIENT)
Age: 44
End: 2024-05-13

## 2024-05-13 VITALS
BODY MASS INDEX: 28.04 KG/M2 | RESPIRATION RATE: 16 BRPM | DIASTOLIC BLOOD PRESSURE: 93 MMHG | HEART RATE: 91 BPM | HEIGHT: 72 IN | WEIGHT: 207 LBS | SYSTOLIC BLOOD PRESSURE: 134 MMHG

## 2024-05-13 DIAGNOSIS — I10 ESSENTIAL (PRIMARY) HYPERTENSION: ICD-10-CM

## 2024-05-13 DIAGNOSIS — E78.00 PURE HYPERCHOLESTEROLEMIA, UNSPECIFIED: ICD-10-CM

## 2024-05-13 PROCEDURE — 93000 ELECTROCARDIOGRAM COMPLETE: CPT

## 2024-05-13 PROCEDURE — G2211 COMPLEX E/M VISIT ADD ON: CPT

## 2024-05-13 PROCEDURE — 99214 OFFICE O/P EST MOD 30 MIN: CPT

## 2024-05-13 RX ORDER — PSYLLIUM HUSK 0.4 G
CAPSULE ORAL
Refills: 0 | Status: DISCONTINUED | COMMUNITY
End: 2024-05-13

## 2024-05-13 RX ORDER — ICOSAPENT ETHYL 1 G/1
1 CAPSULE ORAL
Qty: 180 | Refills: 1 | Status: ACTIVE | COMMUNITY
Start: 2024-05-13 | End: 1900-01-01

## 2024-05-13 RX ORDER — NIFEDIPINE 60 MG/1
60 TABLET, EXTENDED RELEASE ORAL DAILY
Qty: 90 | Refills: 1 | Status: ACTIVE | COMMUNITY
Start: 2021-10-19 | End: 1900-01-01

## 2024-05-13 RX ORDER — CINNAMON BARK 500 MG
CAPSULE ORAL
Refills: 0 | Status: ACTIVE | COMMUNITY

## 2024-05-13 NOTE — ASSESSMENT
[FreeTextEntry1] : Echocardiogram November 20, 2020 demonstrated left ventricle normal in size and function with ejection fraction of 55 to 60%. No significant valvular or structural abnormality noted. Right side appears normal in size.  EKG: Sinus rhythm with no significant ST or T wave changes.  43-year-old man with past medical history of hypertension and dyslipidemia who presents to me for follow-up. Patient continues to be well from a cardiac standpoint. Blood pressure is a bit higher today but he has been off his nifedipine for the last couple of months.  He will restart it and I have told him that he needs to make sure to call us to get refills if he needs them.  I will also prescribe him Vascepa in lieu of his over-the-counter fish oil.  He will have blood work prior to follow-up.  Thankfully he remains completely asymptomatic and EKG today is unremarkable.

## 2024-05-13 NOTE — DISCUSSION/SUMMARY
[FreeTextEntry1] : 1. No additional cardiac testing at this time.   2. Restart Nifedipine ER 60 mg daily for hypertension. 3. Start Vascepa 1 g twice daily for his hypertriglyceridemia.  Blood work prior to follow-up. 4. Monitor BP at home, keep a log and bring to f/u.  5. Patient strongly encouraged on reducing salt intake. 6. Patient encouraged to work on diet to lose weight. Patient encouraged to work on a healthy diet high in lean protein, whole grains and vegetables, and lower in white flour and simple sugars. 7. Patient is encouraged to exercise at least 30 minutes a day everyday of the week. 8. Follow up here in 6 months.   [EKG obtained to assist in diagnosis and management of assessed problem(s)] : EKG obtained to assist in diagnosis and management of assessed problem(s)

## 2024-05-13 NOTE — HISTORY OF PRESENT ILLNESS
[FreeTextEntry1] : Patient presents back to the office today having had appendicitis about a month ago and having had an appendectomy.  That was done without incident.  He did have fever for couple of days after and some reaction to anesthesia but this all resolved and he feels well.  He did run out of nifedipine back in March and never called to get a refill.  He also stopped taking fish oil.  He has not really been monitoring his blood pressures at home.  Patient denies chest pain, shortness of breath, palpitations, orthopnea, presyncope, syncope.

## 2024-09-17 ENCOUNTER — RX RENEWAL (OUTPATIENT)
Age: 44
End: 2024-09-17

## 2024-10-29 ENCOUNTER — NON-APPOINTMENT (OUTPATIENT)
Age: 44
End: 2024-10-29

## 2024-10-29 ENCOUNTER — APPOINTMENT (OUTPATIENT)
Dept: CARDIOLOGY | Facility: CLINIC | Age: 44
End: 2024-10-29
Payer: COMMERCIAL

## 2024-10-29 VITALS
RESPIRATION RATE: 16 BRPM | BODY MASS INDEX: 27.77 KG/M2 | DIASTOLIC BLOOD PRESSURE: 79 MMHG | WEIGHT: 205 LBS | SYSTOLIC BLOOD PRESSURE: 125 MMHG | HEART RATE: 86 BPM | HEIGHT: 72 IN

## 2024-10-29 DIAGNOSIS — E78.00 PURE HYPERCHOLESTEROLEMIA, UNSPECIFIED: ICD-10-CM

## 2024-10-29 DIAGNOSIS — I10 ESSENTIAL (PRIMARY) HYPERTENSION: ICD-10-CM

## 2024-10-29 DIAGNOSIS — R00.2 PALPITATIONS: ICD-10-CM

## 2024-10-29 PROCEDURE — 99214 OFFICE O/P EST MOD 30 MIN: CPT

## 2024-10-29 PROCEDURE — G2211 COMPLEX E/M VISIT ADD ON: CPT | Mod: NC

## 2024-10-29 PROCEDURE — 93000 ELECTROCARDIOGRAM COMPLETE: CPT

## 2024-11-19 ENCOUNTER — APPOINTMENT (OUTPATIENT)
Dept: CARDIOLOGY | Facility: CLINIC | Age: 44
End: 2024-11-19
Payer: COMMERCIAL

## 2024-11-19 PROCEDURE — 93306 TTE W/DOPPLER COMPLETE: CPT

## 2024-12-07 ENCOUNTER — NON-APPOINTMENT (OUTPATIENT)
Age: 44
End: 2024-12-07

## 2025-02-24 ENCOUNTER — TRANSCRIPTION ENCOUNTER (OUTPATIENT)
Age: 45
End: 2025-02-24

## 2025-04-01 ENCOUNTER — APPOINTMENT (OUTPATIENT)
Dept: CARDIOLOGY | Facility: CLINIC | Age: 45
End: 2025-04-01
Payer: COMMERCIAL

## 2025-04-01 ENCOUNTER — NON-APPOINTMENT (OUTPATIENT)
Age: 45
End: 2025-04-01

## 2025-04-01 VITALS
HEIGHT: 72 IN | RESPIRATION RATE: 16 BRPM | DIASTOLIC BLOOD PRESSURE: 71 MMHG | SYSTOLIC BLOOD PRESSURE: 107 MMHG | WEIGHT: 215 LBS | BODY MASS INDEX: 29.12 KG/M2 | HEART RATE: 93 BPM

## 2025-04-01 DIAGNOSIS — I10 ESSENTIAL (PRIMARY) HYPERTENSION: ICD-10-CM

## 2025-04-01 DIAGNOSIS — E78.00 PURE HYPERCHOLESTEROLEMIA, UNSPECIFIED: ICD-10-CM

## 2025-04-01 PROCEDURE — 93000 ELECTROCARDIOGRAM COMPLETE: CPT

## 2025-04-01 PROCEDURE — 99214 OFFICE O/P EST MOD 30 MIN: CPT

## 2025-04-01 PROCEDURE — 93308 TTE F-UP OR LMTD: CPT

## 2025-04-01 RX ORDER — CYANOCOBALAMIN (VITAMIN B-12) 1000 MCG
TABLET ORAL
Refills: 0 | Status: ACTIVE | COMMUNITY

## 2025-07-11 ENCOUNTER — EMERGENCY (EMERGENCY)
Facility: HOSPITAL | Age: 45
LOS: 0 days | Discharge: HOME | End: 2025-07-11
Attending: EMERGENCY MEDICINE
Payer: COMMERCIAL

## 2025-07-11 VITALS
SYSTOLIC BLOOD PRESSURE: 150 MMHG | TEMPERATURE: 99 F | HEART RATE: 73 BPM | WEIGHT: 220.9 LBS | DIASTOLIC BLOOD PRESSURE: 102 MMHG | RESPIRATION RATE: 18 BRPM | OXYGEN SATURATION: 100 %

## 2025-07-11 DIAGNOSIS — Z98.890 OTHER SPECIFIED POSTPROCEDURAL STATES: Chronic | ICD-10-CM

## 2025-07-11 DIAGNOSIS — L03.211 CELLULITIS OF FACE: ICD-10-CM

## 2025-07-11 DIAGNOSIS — I10 ESSENTIAL (PRIMARY) HYPERTENSION: ICD-10-CM

## 2025-07-11 DIAGNOSIS — R22.0 LOCALIZED SWELLING, MASS AND LUMP, HEAD: ICD-10-CM

## 2025-07-11 PROCEDURE — 99283 EMERGENCY DEPT VISIT LOW MDM: CPT

## 2025-07-11 RX ORDER — CEPHALEXIN 250 MG/1
1 CAPSULE ORAL
Qty: 28 | Refills: 0
Start: 2025-07-11 | End: 2025-07-17

## 2025-07-11 RX ORDER — SULFAMETHOXAZOLE/TRIMETHOPRIM 800-160 MG
1 TABLET ORAL ONCE
Refills: 0 | Status: COMPLETED | OUTPATIENT
Start: 2025-07-11 | End: 2025-07-11

## 2025-07-11 RX ORDER — SULFAMETHOXAZOLE/TRIMETHOPRIM 800-160 MG
1 TABLET ORAL
Qty: 14 | Refills: 0
Start: 2025-07-11 | End: 2025-07-17

## 2025-07-11 RX ORDER — CEPHALEXIN 250 MG/1
500 CAPSULE ORAL ONCE
Refills: 0 | Status: COMPLETED | OUTPATIENT
Start: 2025-07-11 | End: 2025-07-11

## 2025-07-11 RX ADMIN — CEPHALEXIN 500 MILLIGRAM(S): 250 CAPSULE ORAL at 10:52

## 2025-07-11 RX ADMIN — Medication 1 TABLET(S): at 10:51

## 2025-07-11 NOTE — ED STATDOCS - CLINICAL SUMMARY MEDICAL DECISION MAKING FREE TEXT BOX
44 year old with no pertinent PMHx presents with a pimple onset a week ago. At  pt was started on clindamycin, took 2 doses. Today with increased local swelling, no signs of periorbital or orbital infection clinically. No sign of abscess. Pt otherwise well appear. Will change meds to bactrim and keflex. Discussed return precautions with pt and that pt should return for new or worsening symptoms.

## 2025-07-11 NOTE — ED ADULT NURSE NOTE - OBJECTIVE STATEMENT
Pt is a 45 y/o male who presents to the Ed with c/o abcess to forehead x few days worsening today. Pt was seen at  and prescribed abx.

## 2025-07-11 NOTE — ED STATDOCS - OBJECTIVE STATEMENT
45 y/o male with a PMHx of HTN presents to the ED c/o area of swelling to right forehead onset Tuesday. Pt was started on an abx clindamycin from  which he took last night and this morning. Pt denies pain to the area, drainage, fevers and chills. Pt endorse that the area has been growing in size for the past few days.

## 2025-07-11 NOTE — ED STATDOCS - PATIENT PORTAL LINK FT
You can access the FollowMyHealth Patient Portal offered by [facility] by registering at the following website: [email or web address]. By joining [employer]’s FollowMyHealth portal, you will also be able to view your health information using other applications (apps) compatible with our system.

## 2025-07-11 NOTE — ED STATDOCS - PROGRESS NOTE DETAILS
ITZEL Kendrick: I participated in the care of this patient. I agree with the history, physical and plan. will change abx to keflex and bactrim. pt to stop taking clinda. Pt stable for dc and given strict instructions to return if worsening symptoms.

## 2025-07-11 NOTE — ED STATDOCS - NSFOLLOWUPINSTRUCTIONS_ED_ALL_ED_FT
Stop taking clindamycin   Take keflex and bactrium as directed   Take motrin 600mg every 6 hours as needed for pain    Take tylenol 650 mg every 4 - 6 hours as needed for pain   Return to the ED if any worsening symptoms.       Cellulitis, Adult  A person's legs and feet. One leg is normal and the other leg is affected by cellulitis.  Cellulitis is a skin infection. The infected area is usually warm, red, swollen, and tender. It most commonly occurs on the lower body, such as the legs, feet, and toes, but this condition can occur on any part of the body. The infection can travel to the muscles, blood, and underlying tissue and become life-threatening without treatment. It is important to get medical treatment right away for this condition.    What are the causes?  Cellulitis is caused by bacteria. The bacteria enter through a break in the skin, such as a cut, burn, insect or animal bite, open sore, or crack.    What increases the risk?  This condition is more likely to occur in people who:  Have a weak body's defense system (immune system).  Are older than 60 years old.  Have diabetes.  Have a type of long-term (chronic) liver disease (cirrhosis) or kidney disease.  Are obese.  Have a skin condition such as:  An itchy rash, such as eczema or psoriasis.  A fungal rash on the feet or in skinfolds.  Blistering rashes, such as shingles or chickenpox.  Slow movement of blood in the veins (venous stasis).  Fluid buildup below the skin (edema).  Have open wounds on the skin, such as cuts, puncture wounds, burns, bites, scrapes, tattoos, piercings, or wounds from surgery.  Have had radiation therapy.  Use IV drugs.  What are the signs or symptoms?  Symptoms of this condition include:  Skin that looks red, purple, or slightly darker than your usual skin color.  Streaks or spots on the skin.  Swollen area of the skin.  Tenderness or pain when an area of the skin is touched.  Warm skin.  Fever or chills.  Blisters.  Tiredness (fatigue).  How is this diagnosed?  This condition is diagnosed based on a medical history and physical exam. You may also have tests, including:  Blood tests.  Imaging tests.  Tests on a sample of fluid taken from the wound (wound culture).  How is this treated?  Treatment for this condition may include:  Medicines. These may include antibiotics or medicines to treat allergies (antihistamines).  Rest.  Applying cold or warm wet cloths (compresses) to the skin.  If the condition is severe, you may need to stay in the hospital and get antibiotics through an IV.  The infection usually starts to get better within 1–2 days of treatment.    Follow these instructions at home:  Medicines    Take over-the-counter and prescription medicines only as told by your health care provider.  If you were prescribed antibiotics, take them as told by your provider. Do not stop using the antibiotic even if you start to feel better.  General instructions    Drink enough fluid to keep your pee (urine) pale yellow.  Do not touch or rub the infected area.  Raise (elevate) the infected area above the level of your heart while you are sitting or lying down.  Return to your normal activities as told by your provider. Ask your provider what activities are safe for you.  Apply warm or cold compresses to the affected area as told by your provider.  Keep all follow-up visits. Your provider will need to make sure that a more serious infection is not developing.  Contact a health care provider if:  You have a fever.  Your symptoms do not improve within 1–2 days of starting treatment or you develop new symptoms.  Your bone or joint underneath the infected area becomes painful after the skin has healed.  Your infection returns in the same area or another area. Signs of this may include:  You notice a swollen bump in the infected area.  Your red area gets larger, turns dark in color, or becomes more painful.  Drainage increases.  Pus or a bad smell develops in your infected area.  You have more pain.  You feel ill and have muscle aches and weakness.  You develop vomiting or diarrhea that will not go away.  Get help right away if:  You notice red streaks coming from the infected area.  You notice the skin turns purple or black and falls off.  This symptom may be an emergency. Get help right away. Call 911.  Do not wait to see if the symptom will go away.  Do not drive yourself to the hospital.  This information is not intended to replace advice given to you by your health care provider. Make sure you discuss any questions you have with your health care provider.

## 2025-07-11 NOTE — ED ADULT NURSE NOTE - CCCP TRG CHIEF CMPLNT
"Pt is out of her benicar. Reports that the prescription was supposed to be written to take twice a day but was written as one time daily. Pt is out of medicine and insurance wont pay to have it refilled. Please contact patient and pharmacy to advise    Reason for Disposition   [1] Request for URGENT new prescription or refill of "essential" medication (i.e., likelihood of harm to patient if not taken) AND [2] triager unable to fill per unit policy    Additional Information   Negative: Diabetes drug error or overdose (e.g., insulin or extra dose)   Negative: [1] DOUBLE DOSE (an extra dose or lesser amount) of prescription drug AND [2] NO symptoms (Exception: a double dose of antibiotics)   Negative: MORE THAN A DOUBLE DOSE of a prescription or over-the-counter (OTC) drug   Negative: [1] DOUBLE DOSE (an extra dose or lesser amount) of over-the-counter (OTC) drug AND [2] any symptoms (e.g., dizziness, nausea, pain, sleepiness)   Negative: [1] DOUBLE DOSE (an extra dose or lesser amount) of prescription drug AND [2] any symptoms (e.g., dizziness, nausea, pain, sleepiness)   Negative: Took another person's prescription drug    Protocols used: MEDICATION QUESTION CALL-A-AH      "
abscess

## 2025-07-11 NOTE — ED ADULT NURSE NOTE - NSFALLUNIVINTERV_ED_ALL_ED
Bed/Stretcher in lowest position, wheels locked, appropriate side rails in place/Call bell, personal items and telephone in reach/Instruct patient to call for assistance before getting out of bed/chair/stretcher/Non-slip footwear applied when patient is off stretcher/[place] to call system/Physically safe environment - no spills, clutter or unnecessary equipment/Purposeful proactive rounding/Room/bathroom lighting operational, light cord in reach

## 2025-07-11 NOTE — ED ADULT TRIAGE NOTE - CHIEF COMPLAINT QUOTE
PT presents to er with complaints of erythema from unknown origin to forehead, started on oral abx last night, denies fevers.

## 2025-07-11 NOTE — ED STATDOCS - ATTENDING APP SHARED VISIT CONTRIBUTION OF CARE
I,Mc Wellington MD,  performed the initial face to face bedside interview with this patient regarding history of present illness, review of symptoms and relevant past medical, social and family history.  I completed an independent physical examination.  I was the initial provider who evaluated this patient. I have signed out the follow up of any pending tests (i.e. labs, radiological studies) to the ACP.  I have communicated the patient’s plan of care and disposition with the ACP.  The history, relevant review of systems, past medical and surgical history, medical decision making, and physical examination was documented by the scribe in my presence and I attest to the accuracy of the documentation.

## 2025-07-11 NOTE — ED STATDOCS - PHYSICAL EXAMINATION
Physical Exam:  Gen: NAD, non-toxic appearing, able to ambulate without assistance  Head: NCAT  HEENT: EOMI, PEERLA, nothing around eyes, no periorbital swelling/redness, no pain with movement of eyes, normal conjunctiva, tongue midline, oral mucosa moist  Lung: CTAB, no respiratory distress, no wheezes/rhonchi/rales B/L, speaking in full sentences  CV: RRR, no murmurs, rubs or gallops, distal pulses 2+ b/l  Abd: soft, nontender, no distention, no guarding, no rigidity, no rebound tenderness  MSK: no visible deformities, ROM normal in UE/LE  Skin: Warm, well perfused, no rash, 3 cm area of edema to middle of forehead, mild erythema, no tenderness to palpation, no fluctuance, no drainage  Psych: normal affect, calm

## 2025-09-08 ENCOUNTER — TRANSCRIPTION ENCOUNTER (OUTPATIENT)
Age: 45
End: 2025-09-08

## 2025-09-08 ENCOUNTER — RX RENEWAL (OUTPATIENT)
Age: 45
End: 2025-09-08

## 2025-09-15 ENCOUNTER — APPOINTMENT (OUTPATIENT)
Dept: CARDIOLOGY | Facility: CLINIC | Age: 45
End: 2025-09-15
Payer: COMMERCIAL

## 2025-09-15 VITALS
OXYGEN SATURATION: 98 % | BODY MASS INDEX: 28.75 KG/M2 | HEART RATE: 82 BPM | SYSTOLIC BLOOD PRESSURE: 138 MMHG | DIASTOLIC BLOOD PRESSURE: 76 MMHG | WEIGHT: 212 LBS | RESPIRATION RATE: 13 BRPM

## 2025-09-15 DIAGNOSIS — I10 ESSENTIAL (PRIMARY) HYPERTENSION: ICD-10-CM

## 2025-09-15 DIAGNOSIS — E78.00 PURE HYPERCHOLESTEROLEMIA, UNSPECIFIED: ICD-10-CM

## 2025-09-15 PROCEDURE — 93000 ELECTROCARDIOGRAM COMPLETE: CPT

## 2025-09-15 PROCEDURE — 99214 OFFICE O/P EST MOD 30 MIN: CPT
